# Patient Record
Sex: MALE | Race: BLACK OR AFRICAN AMERICAN | Employment: UNEMPLOYED | URBAN - METROPOLITAN AREA
[De-identification: names, ages, dates, MRNs, and addresses within clinical notes are randomized per-mention and may not be internally consistent; named-entity substitution may affect disease eponyms.]

---

## 2021-04-29 ENCOUNTER — HOSPITAL ENCOUNTER (OUTPATIENT)
Dept: PREADMISSION TESTING | Age: 56
Discharge: HOME | End: 2021-04-29
Payer: MEDICARE

## 2021-04-29 LAB — SARS-COV-2, COV2: NORMAL

## 2021-04-29 PROCEDURE — U0003 INFECTIOUS AGENT DETECTION BY NUCLEIC ACID (DNA OR RNA); SEVERE ACUTE RESPIRATORY SYNDROME CORONAVIRUS 2 (SARS-COV-2) (CORONAVIRUS DISEASE [COVID-19]), AMPLIFIED PROBE TECHNIQUE, MAKING USE OF HIGH THROUGHPUT TECHNOLOGIES AS DESCRIBED BY CMS-2020-01-R: HCPCS

## 2021-04-30 LAB — SARS-COV-2, COV2NT: NOT DETECTED

## 2021-05-03 ENCOUNTER — HOSPITAL ENCOUNTER (OUTPATIENT)
Age: 56
Setting detail: OUTPATIENT SURGERY
Discharge: HOME | End: 2021-05-03
Attending: INTERNAL MEDICINE | Admitting: INTERNAL MEDICINE
Payer: MEDICARE

## 2021-05-03 ENCOUNTER — ANESTHESIA EVENT (OUTPATIENT)
Dept: ENDOSCOPY | Age: 56
End: 2021-05-03
Payer: MEDICARE

## 2021-05-03 ENCOUNTER — APPOINTMENT (OUTPATIENT)
Dept: ENDOSCOPY | Age: 56
End: 2021-05-03
Attending: INTERNAL MEDICINE
Payer: MEDICARE

## 2021-05-03 ENCOUNTER — ANESTHESIA (OUTPATIENT)
Dept: ENDOSCOPY | Age: 56
End: 2021-05-03
Payer: MEDICARE

## 2021-05-03 VITALS
SYSTOLIC BLOOD PRESSURE: 141 MMHG | TEMPERATURE: 97.6 F | BODY MASS INDEX: 24.78 KG/M2 | OXYGEN SATURATION: 95 % | WEIGHT: 177 LBS | RESPIRATION RATE: 16 BRPM | HEART RATE: 79 BPM | HEIGHT: 71 IN | DIASTOLIC BLOOD PRESSURE: 87 MMHG

## 2021-05-03 PROCEDURE — 74011250636 HC RX REV CODE- 250/636: Performed by: NURSE ANESTHETIST, CERTIFIED REGISTERED

## 2021-05-03 PROCEDURE — 76040000019: Performed by: INTERNAL MEDICINE

## 2021-05-03 PROCEDURE — 88305 TISSUE EXAM BY PATHOLOGIST: CPT

## 2021-05-03 PROCEDURE — 76060000031 HC ANESTHESIA FIRST 0.5 HR: Performed by: INTERNAL MEDICINE

## 2021-05-03 PROCEDURE — 74011000250 HC RX REV CODE- 250: Performed by: NURSE ANESTHETIST, CERTIFIED REGISTERED

## 2021-05-03 PROCEDURE — 2709999900 HC NON-CHARGEABLE SUPPLY: Performed by: INTERNAL MEDICINE

## 2021-05-03 RX ORDER — ERGOCALCIFEROL 1.25 MG/1
50000 CAPSULE ORAL
COMMUNITY

## 2021-05-03 RX ORDER — LITHIUM CARBONATE 300 MG/1
300 CAPSULE ORAL
COMMUNITY

## 2021-05-03 RX ORDER — MIRTAZAPINE 15 MG/1
15 TABLET, FILM COATED ORAL
COMMUNITY

## 2021-05-03 RX ORDER — BUPROPION HYDROCHLORIDE 150 MG/1
150 TABLET, EXTENDED RELEASE ORAL DAILY
COMMUNITY

## 2021-05-03 RX ORDER — PROPOFOL 10 MG/ML
INJECTION, EMULSION INTRAVENOUS AS NEEDED
Status: DISCONTINUED | OUTPATIENT
Start: 2021-05-03 | End: 2021-05-03 | Stop reason: HOSPADM

## 2021-05-03 RX ORDER — TRAZODONE HYDROCHLORIDE 100 MG/1
100 TABLET ORAL
COMMUNITY

## 2021-05-03 RX ORDER — SODIUM CHLORIDE, SODIUM LACTATE, POTASSIUM CHLORIDE, CALCIUM CHLORIDE 600; 310; 30; 20 MG/100ML; MG/100ML; MG/100ML; MG/100ML
50 INJECTION, SOLUTION INTRAVENOUS CONTINUOUS
Status: DISCONTINUED | OUTPATIENT
Start: 2021-05-03 | End: 2021-05-03 | Stop reason: HOSPADM

## 2021-05-03 RX ORDER — SODIUM CHLORIDE, SODIUM LACTATE, POTASSIUM CHLORIDE, CALCIUM CHLORIDE 600; 310; 30; 20 MG/100ML; MG/100ML; MG/100ML; MG/100ML
INJECTION, SOLUTION INTRAVENOUS
Status: DISCONTINUED | OUTPATIENT
Start: 2021-05-03 | End: 2021-05-03 | Stop reason: HOSPADM

## 2021-05-03 RX ORDER — GLYCOPYRROLATE 0.2 MG/ML
INJECTION INTRAMUSCULAR; INTRAVENOUS AS NEEDED
Status: DISCONTINUED | OUTPATIENT
Start: 2021-05-03 | End: 2021-05-03 | Stop reason: HOSPADM

## 2021-05-03 RX ORDER — TAMSULOSIN HYDROCHLORIDE 0.4 MG/1
0.4 CAPSULE ORAL DAILY
COMMUNITY

## 2021-05-03 RX ORDER — METFORMIN HYDROCHLORIDE 500 MG/1
500 TABLET ORAL DAILY
COMMUNITY

## 2021-05-03 RX ORDER — OLANZAPINE 10 MG/1
10 TABLET ORAL
COMMUNITY

## 2021-05-03 RX ADMIN — PROPOFOL 50 MG: 10 INJECTION, EMULSION INTRAVENOUS at 11:49

## 2021-05-03 RX ADMIN — PROPOFOL 50 MG: 10 INJECTION, EMULSION INTRAVENOUS at 11:45

## 2021-05-03 RX ADMIN — GLYCOPYRROLATE 0.2 MG: 0.2 INJECTION, SOLUTION INTRAMUSCULAR; INTRAVENOUS at 11:38

## 2021-05-03 RX ADMIN — PROPOFOL 50 MG: 10 INJECTION, EMULSION INTRAVENOUS at 11:47

## 2021-05-03 RX ADMIN — SODIUM CHLORIDE, POTASSIUM CHLORIDE, SODIUM LACTATE AND CALCIUM CHLORIDE: 600; 310; 30; 20 INJECTION, SOLUTION INTRAVENOUS at 11:30

## 2021-05-03 RX ADMIN — PROPOFOL 50 MG: 10 INJECTION, EMULSION INTRAVENOUS at 11:43

## 2021-05-03 RX ADMIN — PROPOFOL 50 MG: 10 INJECTION, EMULSION INTRAVENOUS at 11:42

## 2021-05-03 NOTE — PROGRESS NOTES
Patient and mother given discharge education and gave back verbal understanding. Patient IV out and no signs of infection. Patient take to ride's car via wheelchair at the front of the hospital entrance.

## 2021-05-03 NOTE — ANESTHESIA PREPROCEDURE EVALUATION
Relevant Problems   No relevant active problems       Anesthetic History   No history of anesthetic complications            Review of Systems / Medical History  Patient summary reviewed, nursing notes reviewed and pertinent labs reviewed    Pulmonary  Within defined limits                 Neuro/Psych         Psychiatric history     Cardiovascular  Within defined limits                     GI/Hepatic/Renal  Within defined limits              Endo/Other  Within defined limits           Other Findings            Past Medical History:   Diagnosis Date    Anxiety     Depression     Ill-defined condition     Mentally slow according to mother       History reviewed. No pertinent surgical history.     Current Outpatient Medications   Medication Instructions    buPROPion SR (WELLBUTRIN SR) 150 mg, Oral, DAILY, In the morning     Cetirizine 10 mg, Oral, DAILY    ergocalciferol (ERGOCALCIFEROL) 50,000 Units, Oral, EVERY 7 DAYS    lithium carbonate 300 mg, Oral, EVERY BEDTIME, 2 pills     metFORMIN (GLUCOPHAGE) 500 mg, Oral, DAILY, In the morning     mirtazapine (REMERON) 15 mg, Oral, EVERY BEDTIME    OLANZapine (ZYPREXA) 10 mg, Oral, EVERY BEDTIME    tamsulosin (FLOMAX) 0.4 mg, Oral, DAILY    traZODone (DESYREL) 100 mg, Oral, EVERY BEDTIME       Current Facility-Administered Medications   Medication Dose Route Frequency    lactated Ringers infusion  50 mL/hr IntraVENous CONTINUOUS       Patient Vitals for the past 24 hrs:   Temp Pulse Resp BP SpO2   05/03/21 1048 37.1 °C (98.7 °F) (!) 56 18 (!) 147/74 97 %       No results found for: WBC, WBCLT, HGBPOC, HGB, HGBP, HCTPOC, HCT, PHCT, RBCH, PLT, MCV, HGBEXT, HCTEXT, PLTEXT  No results found for: NA, K, CL, CO2, AGAP, GLU, BUN, CREA, BUCR, GFRAA, GFRNA, CA, GFRAA  No results found for: APTT, PTP, INR, INREXT  No results found for: GLU, GLUCPOC  Physical Exam    Airway  Mallampati: I  TM Distance: 4 - 6 cm  Neck ROM: normal range of motion   Mouth opening: Normal Cardiovascular    Rhythm: regular  Rate: normal         Dental  No notable dental hx       Pulmonary  Breath sounds clear to auscultation               Abdominal  GI exam deferred       Other Findings            Anesthetic Plan    ASA: 2  Anesthesia type: total IV anesthesia and general          Induction: Intravenous  Anesthetic plan and risks discussed with: Patient and Family      General anesthesia was prescribed for this patient because by definition it is \"a drug-induced loss of consciousness during which patients are not arousable, even by painful stimulation. \" Sometimes, the ability to independently maintain ventilatory function is often impaired and patients often require assistance in maintaining a patent airway. Occasionally, positive pressure ventilation may be required because of depressed spontaneous ventilation or drug-induced depression of neuromuscular function. This depth of anesthesia is preferred for endoscopic/esophageal procedures to facilitate the procedure and for patient safety/quality of care.

## 2021-05-11 NOTE — ANESTHESIA POSTPROCEDURE EVALUATION
Procedure(s):  COLONOSCOPY. MAC    Anesthesia Post Evaluation      Multimodal analgesia: multimodal analgesia not used between 6 hours prior to anesthesia start to PACU discharge  Patient location during evaluation: bedside (Endoscopy suite)  Patient participation: complete - patient cannot participate  Level of consciousness: awake and alert  Pain score: 0  Pain management: adequate  Airway patency: patent  Anesthetic complications: no  Cardiovascular status: acceptable  Respiratory status: acceptable and nasal cannula  Hydration status: acceptable  Comments: This patient remained on the stretcher. The patient was handed off to the endoscopy nursing team.  All questions regarding pre-, intra-, and postoperative care were answered.   Post anesthesia nausea and vomiting:  none      INITIAL Post-op Vital signs:   Vitals Value Taken Time   /87 05/03/21 1229   Temp 36.4 °C (97.6 °F) 05/03/21 1210   Pulse 79 05/03/21 1229   Resp 16 05/03/21 1229   SpO2 95 % 05/03/21 1229

## 2024-02-28 ENCOUNTER — HOSPITAL ENCOUNTER (OUTPATIENT)
Facility: HOSPITAL | Age: 59
Discharge: HOME | End: 2024-03-02
Payer: MEDICARE

## 2024-02-28 ENCOUNTER — TRANSCRIBE ORDERS (OUTPATIENT)
Facility: HOSPITAL | Age: 59
End: 2024-02-28

## 2024-02-28 DIAGNOSIS — R05.9 COUGH, UNSPECIFIED TYPE: ICD-10-CM

## 2024-02-28 DIAGNOSIS — R05.9 COUGH, UNSPECIFIED TYPE: Primary | ICD-10-CM

## 2024-02-28 PROCEDURE — 71046 X-RAY EXAM CHEST 2 VIEWS: CPT

## 2024-06-25 ENCOUNTER — HOSPITAL ENCOUNTER (EMERGENCY)
Facility: HOSPITAL | Age: 59
Discharge: HOME | End: 2024-06-25

## 2024-06-25 VITALS
RESPIRATION RATE: 18 BRPM | TEMPERATURE: 98.2 F | HEART RATE: 58 BPM | SYSTOLIC BLOOD PRESSURE: 147 MMHG | OXYGEN SATURATION: 96 % | DIASTOLIC BLOOD PRESSURE: 82 MMHG

## 2024-06-26 NOTE — ED NOTES
Patient arrived to ED with family. Patient has no complaints at this time, Alert and oriented times four. Exhibits some flight of ideas but able to hold communication with nurse. Upon arrival patient remains calm, no signs of agitation expressed. Brought in by family from increased agitation and not following medication regimen. Patient refused to be seen in ED. Family did not tell patient he was coming into ED to be seen but wanted to get him help for his schizophrenia and to get him to take medication. Patient unable to get into psychiatric doctor until August due to scheduling issue.     Dr. Conti is patient Psychiatric doctor but told family to get TDO paper work at hospital. [facility] Explained to family the process of ECO for her to file so we can help him.  brought in to communicate the steps with family. Notified ED physician of situation, charge nurse notified. While figuring out solution to situation patient remained in ED triage for an hour with ED tech and remained calm the whole process. Patient and family walked out of ED by [place] police.

## 2024-08-02 ENCOUNTER — HOSPITAL ENCOUNTER (INPATIENT)
Facility: HOSPITAL | Age: 59
LOS: 9 days | Discharge: HOME | DRG: 885 | End: 2024-08-12
Attending: EMERGENCY MEDICINE | Admitting: PSYCHIATRY & NEUROLOGY
Payer: MEDICARE

## 2024-08-02 DIAGNOSIS — F20.1 DISORGANIZED SCHIZOPHRENIA (HCC): Primary | ICD-10-CM

## 2024-08-02 LAB
ALBUMIN SERPL-MCNC: 3.9 G/DL (ref 3.5–5)
ALBUMIN/GLOB SERPL: 1.1 (ref 1.1–2.2)
ALP SERPL-CCNC: 88 U/L (ref 45–117)
ALT SERPL-CCNC: 33 U/L (ref 12–78)
ANION GAP SERPL CALC-SCNC: 11 MMOL/L (ref 5–15)
AST SERPL W P-5'-P-CCNC: 15 U/L (ref 15–37)
BASOPHILS # BLD: 0 K/UL (ref 0–0.1)
BASOPHILS NFR BLD: 1 % (ref 0–1)
BILIRUB SERPL-MCNC: 0.9 MG/DL (ref 0.2–1)
BUN SERPL-MCNC: 14 MG/DL (ref 6–20)
BUN/CREAT SERPL: 15 (ref 12–20)
CA-I BLD-MCNC: 8.8 MG/DL (ref 8.5–10.1)
CHLORIDE SERPL-SCNC: 105 MMOL/L (ref 97–108)
CO2 SERPL-SCNC: 24 MMOL/L (ref 21–32)
CREAT SERPL-MCNC: 0.91 MG/DL (ref 0.7–1.3)
DIFFERENTIAL METHOD BLD: NORMAL
EOSINOPHIL # BLD: 0.2 K/UL (ref 0–0.4)
EOSINOPHIL NFR BLD: 4 % (ref 0–7)
ERYTHROCYTE [DISTWIDTH] IN BLOOD BY AUTOMATED COUNT: 11.9 % (ref 11.5–14.5)
FLUAV RNA SPEC QL NAA+PROBE: NOT DETECTED
FLUBV RNA SPEC QL NAA+PROBE: NOT DETECTED
GLOBULIN SER CALC-MCNC: 3.4 G/DL (ref 2–4)
GLUCOSE SERPL-MCNC: 101 MG/DL (ref 65–100)
HCT VFR BLD AUTO: 41.3 % (ref 36.6–50.3)
HGB BLD-MCNC: 13.6 G/DL (ref 12.1–17)
IMM GRANULOCYTES # BLD AUTO: 0 K/UL (ref 0–0.04)
IMM GRANULOCYTES NFR BLD AUTO: 0 % (ref 0–0.5)
LYMPHOCYTES # BLD: 1.8 K/UL (ref 0.8–3.5)
LYMPHOCYTES NFR BLD: 40 % (ref 12–49)
MCH RBC QN AUTO: 29.1 PG (ref 26–34)
MCHC RBC AUTO-ENTMCNC: 32.9 G/DL (ref 30–36.5)
MCV RBC AUTO: 88.2 FL (ref 80–99)
MONOCYTES # BLD: 0.4 K/UL (ref 0–1)
MONOCYTES NFR BLD: 8 % (ref 5–13)
NEUTS SEG # BLD: 2.2 K/UL (ref 1.8–8)
NEUTS SEG NFR BLD: 47 % (ref 32–75)
NRBC # BLD: 0 K/UL (ref 0–0.01)
NRBC BLD-RTO: 0 PER 100 WBC
PLATELET # BLD AUTO: 184 K/UL (ref 150–400)
PMV BLD AUTO: 10.3 FL (ref 8.9–12.9)
POTASSIUM SERPL-SCNC: 3.8 MMOL/L (ref 3.5–5.1)
PROT SERPL-MCNC: 7.3 G/DL (ref 6.4–8.2)
RBC # BLD AUTO: 4.68 M/UL (ref 4.1–5.7)
SARS-COV-2 RNA RESP QL NAA+PROBE: NOT DETECTED
SODIUM SERPL-SCNC: 140 MMOL/L (ref 136–145)
WBC # BLD AUTO: 4.6 K/UL (ref 4.1–11.1)

## 2024-08-02 PROCEDURE — 80178 ASSAY OF LITHIUM: CPT

## 2024-08-02 PROCEDURE — 80053 COMPREHEN METABOLIC PANEL: CPT

## 2024-08-02 PROCEDURE — 87636 SARSCOV2 & INF A&B AMP PRB: CPT

## 2024-08-02 PROCEDURE — 83036 HEMOGLOBIN GLYCOSYLATED A1C: CPT

## 2024-08-02 PROCEDURE — 82077 ASSAY SPEC XCP UR&BREATH IA: CPT

## 2024-08-02 PROCEDURE — 96372 THER/PROPH/DIAG INJ SC/IM: CPT

## 2024-08-02 PROCEDURE — 96374 THER/PROPH/DIAG INJ IV PUSH: CPT

## 2024-08-02 PROCEDURE — 6360000002 HC RX W HCPCS: Performed by: EMERGENCY MEDICINE

## 2024-08-02 PROCEDURE — 85025 COMPLETE CBC W/AUTO DIFF WBC: CPT

## 2024-08-02 PROCEDURE — 99285 EMERGENCY DEPT VISIT HI MDM: CPT

## 2024-08-02 RX ORDER — DIPHENHYDRAMINE HYDROCHLORIDE 50 MG/ML
25 INJECTION INTRAMUSCULAR; INTRAVENOUS
Status: COMPLETED | OUTPATIENT
Start: 2024-08-02 | End: 2024-08-02

## 2024-08-02 RX ORDER — HALOPERIDOL 5 MG/ML
5 INJECTION INTRAMUSCULAR EVERY 6 HOURS PRN
Status: DISCONTINUED | OUTPATIENT
Start: 2024-08-02 | End: 2024-08-03

## 2024-08-02 RX ADMIN — DIPHENHYDRAMINE HYDROCHLORIDE 25 MG: 50 INJECTION INTRAMUSCULAR; INTRAVENOUS at 21:13

## 2024-08-02 RX ADMIN — HALOPERIDOL LACTATE 5 MG: 5 INJECTION, SOLUTION INTRAMUSCULAR at 21:17

## 2024-08-02 ASSESSMENT — PAIN - FUNCTIONAL ASSESSMENT: PAIN_FUNCTIONAL_ASSESSMENT: 0-10

## 2024-08-02 ASSESSMENT — LIFESTYLE VARIABLES
HOW OFTEN DO YOU HAVE A DRINK CONTAINING ALCOHOL: NEVER
HOW MANY STANDARD DRINKS CONTAINING ALCOHOL DO YOU HAVE ON A TYPICAL DAY: PATIENT DOES NOT DRINK

## 2024-08-02 ASSESSMENT — PAIN SCALES - GENERAL: PAINLEVEL_OUTOF10: 0

## 2024-08-02 NOTE — ED TRIAGE NOTES
Patient presents voluntarily with sister/POA for complaint of mental health problem.  She states he was recently discharged from [facility].  Has since stopped taking PO meds other than vistaril which he calls \"cogentin.\"  She would like assistance in medication management and would like him to receive a Risperidone injection, unsure of dosing.  Last received 7/11/2024.  Denies SI/HI in triage.

## 2024-08-03 PROBLEM — F23 SCHIZOPHRENIA, ACUTE (HCC): Status: RESOLVED | Noted: 2024-08-03 | Resolved: 2024-08-03

## 2024-08-03 PROBLEM — F23 SCHIZOPHRENIA, ACUTE (HCC): Status: ACTIVE | Noted: 2024-08-03

## 2024-08-03 PROBLEM — F20.0 PARANOID SCHIZOPHRENIA (HCC): Status: ACTIVE | Noted: 2024-08-03

## 2024-08-03 LAB
AMPHET UR QL SCN: NEGATIVE
APPEARANCE UR: CLEAR
BACTERIA URNS QL MICRO: NEGATIVE /HPF
BARBITURATES UR QL SCN: NEGATIVE
BENZODIAZ UR QL: NEGATIVE
BILIRUB UR QL: NEGATIVE
CANNABINOIDS UR QL SCN: NEGATIVE
COCAINE UR QL SCN: NEGATIVE
COLOR UR: NORMAL
DATE LAST DOSE: NORMAL
DOSE AMOUNT: NORMAL UNITS
ETHANOL SERPL-MCNC: <10 MG/DL (ref 0–0.08)
GLUCOSE UR STRIP.AUTO-MCNC: NEGATIVE MG/DL
HGB UR QL STRIP: NEGATIVE
KETONES UR QL STRIP.AUTO: NEGATIVE MG/DL
LEUKOCYTE ESTERASE UR QL STRIP.AUTO: NEGATIVE
LITHIUM SERPL-SCNC: <0.2 MMOL/L
Lab: NORMAL
MDMA, URINE: NEGATIVE
METHADONE UR QL: NEGATIVE
NITRITE UR QL STRIP.AUTO: NEGATIVE
OPIATES UR QL: NEGATIVE
PCP UR QL: NEGATIVE
PH UR STRIP: 6 (ref 5–8)
PROT UR STRIP-MCNC: NEGATIVE MG/DL
RBC #/AREA URNS HPF: NORMAL /HPF (ref 0–3)
SP GR UR REFRACTOMETRY: 1.02 (ref 1–1.03)
URINE CULTURE IF INDICATED: NORMAL
UROBILINOGEN UR QL STRIP.AUTO: 0.2 EU/DL (ref 0.2–1)
WBC URNS QL MICRO: NORMAL /HPF (ref 0–5)

## 2024-08-03 PROCEDURE — 1240000000 HC EMOTIONAL WELLNESS R&B

## 2024-08-03 PROCEDURE — 80307 DRUG TEST PRSMV CHEM ANLYZR: CPT

## 2024-08-03 PROCEDURE — 81001 URINALYSIS AUTO W/SCOPE: CPT

## 2024-08-03 PROCEDURE — 6370000000 HC RX 637 (ALT 250 FOR IP): Performed by: PSYCHIATRY & NEUROLOGY

## 2024-08-03 PROCEDURE — 6370000000 HC RX 637 (ALT 250 FOR IP): Performed by: HOSPITALIST

## 2024-08-03 RX ORDER — TRAZODONE HYDROCHLORIDE 100 MG/1
100 TABLET ORAL NIGHTLY
Status: DISCONTINUED | OUTPATIENT
Start: 2024-08-03 | End: 2024-08-12 | Stop reason: HOSPADM

## 2024-08-03 RX ORDER — LITHIUM CARBONATE 300 MG/1
600 TABLET, FILM COATED, EXTENDED RELEASE ORAL
Status: ON HOLD | COMMUNITY
Start: 2024-07-13 | End: 2024-08-11

## 2024-08-03 RX ORDER — TRAZODONE HYDROCHLORIDE 100 MG/1
100 TABLET ORAL NIGHTLY
Status: ON HOLD | COMMUNITY
End: 2024-08-11

## 2024-08-03 RX ORDER — BUPROPION HYDROCHLORIDE 150 MG/1
150 TABLET, EXTENDED RELEASE ORAL DAILY
Status: DISCONTINUED | OUTPATIENT
Start: 2024-08-03 | End: 2024-08-12 | Stop reason: HOSPADM

## 2024-08-03 RX ORDER — ATORVASTATIN CALCIUM 10 MG/1
5 TABLET, FILM COATED ORAL NIGHTLY
Status: DISCONTINUED | OUTPATIENT
Start: 2024-08-04 | End: 2024-08-12 | Stop reason: HOSPADM

## 2024-08-03 RX ORDER — BUPROPION HYDROCHLORIDE 150 MG/1
150 TABLET, EXTENDED RELEASE ORAL DAILY
Status: ON HOLD | COMMUNITY
End: 2024-08-11

## 2024-08-03 RX ORDER — OLANZAPINE 10 MG/1
10 TABLET ORAL NIGHTLY
Status: ON HOLD | COMMUNITY
End: 2024-08-11

## 2024-08-03 RX ORDER — ATORVASTATIN CALCIUM 10 MG/1
5 TABLET, FILM COATED ORAL DAILY
Status: DISCONTINUED | OUTPATIENT
Start: 2024-08-03 | End: 2024-08-03

## 2024-08-03 RX ORDER — CHOLECALCIFEROL (VITAMIN D3) 50 MCG
TABLET ORAL
Status: ON HOLD | COMMUNITY
Start: 2024-05-31 | End: 2024-08-11 | Stop reason: HOSPADM

## 2024-08-03 RX ORDER — POLYETHYLENE GLYCOL 3350 17 G/17G
17 POWDER, FOR SOLUTION ORAL DAILY PRN
Status: DISCONTINUED | OUTPATIENT
Start: 2024-08-03 | End: 2024-08-12 | Stop reason: HOSPADM

## 2024-08-03 RX ORDER — OLANZAPINE 10 MG/1
10 TABLET, ORALLY DISINTEGRATING ORAL NIGHTLY
Status: DISCONTINUED | OUTPATIENT
Start: 2024-08-03 | End: 2024-08-12 | Stop reason: HOSPADM

## 2024-08-03 RX ORDER — FLUTICASONE PROPIONATE 50 MCG
SPRAY, SUSPENSION (ML) NASAL
COMMUNITY
Start: 2024-06-19

## 2024-08-03 RX ORDER — HYDROXYZINE PAMOATE 50 MG/1
50 CAPSULE ORAL 4 TIMES DAILY PRN
Status: ON HOLD | COMMUNITY
Start: 2024-07-13 | End: 2024-08-11

## 2024-08-03 RX ORDER — ACETAMINOPHEN 325 MG/1
650 TABLET ORAL EVERY 4 HOURS PRN
Status: DISCONTINUED | OUTPATIENT
Start: 2024-08-03 | End: 2024-08-12 | Stop reason: HOSPADM

## 2024-08-03 RX ORDER — CETIRIZINE HYDROCHLORIDE 10 MG/1
10 TABLET ORAL DAILY
Status: ON HOLD | COMMUNITY
Start: 2024-06-06 | End: 2024-08-11

## 2024-08-03 RX ORDER — TAMSULOSIN HYDROCHLORIDE 0.4 MG/1
0.4 CAPSULE ORAL DAILY
COMMUNITY

## 2024-08-03 RX ORDER — AMLODIPINE BESYLATE 10 MG/1
10 TABLET ORAL DAILY
Status: ON HOLD | COMMUNITY
Start: 2024-06-26 | End: 2024-08-11

## 2024-08-03 RX ORDER — HYDROXYZINE PAMOATE 25 MG/1
50 CAPSULE ORAL 4 TIMES DAILY PRN
Status: DISCONTINUED | OUTPATIENT
Start: 2024-08-03 | End: 2024-08-12 | Stop reason: HOSPADM

## 2024-08-03 RX ORDER — AMLODIPINE BESYLATE 10 MG/1
10 TABLET ORAL DAILY
Status: DISCONTINUED | OUTPATIENT
Start: 2024-08-03 | End: 2024-08-12 | Stop reason: HOSPADM

## 2024-08-03 RX ORDER — LITHIUM CARBONATE 300 MG/1
600 CAPSULE ORAL
Status: DISCONTINUED | OUTPATIENT
Start: 2024-08-03 | End: 2024-08-12 | Stop reason: HOSPADM

## 2024-08-03 RX ORDER — MONTELUKAST SODIUM 10 MG/1
10 TABLET ORAL NIGHTLY
COMMUNITY
Start: 2024-06-26

## 2024-08-03 RX ORDER — HYDROXYZINE 50 MG/1
50 TABLET, FILM COATED ORAL 3 TIMES DAILY PRN
Status: DISCONTINUED | OUTPATIENT
Start: 2024-08-03 | End: 2024-08-03

## 2024-08-03 RX ORDER — MAGNESIUM HYDROXIDE/ALUMINUM HYDROXICE/SIMETHICONE 120; 1200; 1200 MG/30ML; MG/30ML; MG/30ML
30 SUSPENSION ORAL EVERY 6 HOURS PRN
Status: DISCONTINUED | OUTPATIENT
Start: 2024-08-03 | End: 2024-08-12 | Stop reason: HOSPADM

## 2024-08-03 RX ORDER — CETIRIZINE HYDROCHLORIDE 5 MG/1
10 TABLET ORAL DAILY
Status: DISCONTINUED | OUTPATIENT
Start: 2024-08-03 | End: 2024-08-12 | Stop reason: HOSPADM

## 2024-08-03 RX ORDER — MONTELUKAST SODIUM 10 MG/1
10 TABLET ORAL NIGHTLY
Status: DISCONTINUED | OUTPATIENT
Start: 2024-08-03 | End: 2024-08-12 | Stop reason: HOSPADM

## 2024-08-03 RX ORDER — PRAVASTATIN SODIUM 20 MG
20 TABLET ORAL DAILY
Status: ON HOLD | COMMUNITY
Start: 2024-06-23 | End: 2024-08-11

## 2024-08-03 RX ORDER — HALOPERIDOL 5 MG/1
5 TABLET ORAL EVERY 4 HOURS PRN
Status: DISCONTINUED | OUTPATIENT
Start: 2024-08-03 | End: 2024-08-12 | Stop reason: HOSPADM

## 2024-08-03 RX ORDER — TRAZODONE HYDROCHLORIDE 50 MG/1
50 TABLET ORAL NIGHTLY PRN
Status: DISCONTINUED | OUTPATIENT
Start: 2024-08-03 | End: 2024-08-03

## 2024-08-03 RX ORDER — FLUTICASONE PROPIONATE 50 MCG
1 SPRAY, SUSPENSION (ML) NASAL DAILY
Status: DISCONTINUED | OUTPATIENT
Start: 2024-08-03 | End: 2024-08-12 | Stop reason: HOSPADM

## 2024-08-03 RX ORDER — TAMSULOSIN HYDROCHLORIDE 0.4 MG/1
0.4 CAPSULE ORAL DAILY
Status: DISCONTINUED | OUTPATIENT
Start: 2024-08-03 | End: 2024-08-12 | Stop reason: HOSPADM

## 2024-08-03 RX ORDER — BENZTROPINE MESYLATE 0.5 MG/1
0.5 TABLET ORAL 2 TIMES DAILY PRN
Status: DISCONTINUED | OUTPATIENT
Start: 2024-08-03 | End: 2024-08-12 | Stop reason: HOSPADM

## 2024-08-03 RX ORDER — NICOTINE 21 MG/24HR
1 PATCH, TRANSDERMAL 24 HOURS TRANSDERMAL DAILY
Status: DISCONTINUED | OUTPATIENT
Start: 2024-08-03 | End: 2024-08-12 | Stop reason: HOSPADM

## 2024-08-03 RX ORDER — DIPHENHYDRAMINE HYDROCHLORIDE 50 MG/ML
50 INJECTION INTRAMUSCULAR; INTRAVENOUS EVERY 4 HOURS PRN
Status: DISCONTINUED | OUTPATIENT
Start: 2024-08-03 | End: 2024-08-12 | Stop reason: HOSPADM

## 2024-08-03 RX ORDER — HALOPERIDOL 5 MG/ML
5 INJECTION INTRAMUSCULAR EVERY 4 HOURS PRN
Status: DISCONTINUED | OUTPATIENT
Start: 2024-08-03 | End: 2024-08-12 | Stop reason: HOSPADM

## 2024-08-03 RX ADMIN — TAMSULOSIN HYDROCHLORIDE 0.4 MG: 0.4 CAPSULE ORAL at 16:23

## 2024-08-03 RX ADMIN — AMLODIPINE BESYLATE 10 MG: 10 TABLET ORAL at 16:23

## 2024-08-03 RX ADMIN — TRAZODONE HYDROCHLORIDE 100 MG: 100 TABLET ORAL at 20:40

## 2024-08-03 RX ADMIN — CETIRIZINE HYDROCHLORIDE 10 MG: 5 TABLET ORAL at 16:23

## 2024-08-03 RX ADMIN — OLANZAPINE 10 MG: 10 TABLET, ORALLY DISINTEGRATING ORAL at 20:40

## 2024-08-03 RX ADMIN — LITHIUM CARBONATE 600 MG: 300 CAPSULE, GELATIN COATED ORAL at 20:40

## 2024-08-03 RX ADMIN — MONTELUKAST 10 MG: 10 TABLET, FILM COATED ORAL at 20:40

## 2024-08-03 RX ADMIN — BUPROPION HYDROCHLORIDE 150 MG: 150 TABLET, FILM COATED, EXTENDED RELEASE ORAL at 14:48

## 2024-08-03 ASSESSMENT — PAIN - FUNCTIONAL ASSESSMENT: PAIN_FUNCTIONAL_ASSESSMENT: NONE - DENIES PAIN

## 2024-08-03 ASSESSMENT — SLEEP AND FATIGUE QUESTIONNAIRES
AVERAGE NUMBER OF SLEEP HOURS: 8
SLEEP PATTERN: DIFFICULTY FALLING ASLEEP
DO YOU USE A SLEEP AID: YES
DO YOU HAVE DIFFICULTY SLEEPING: YES

## 2024-08-03 NOTE — ED NOTES
Pt met BSMART. Per BSMART pt will need ECO since he lacks capacity and is attempting to leave multiple times.

## 2024-08-03 NOTE — ED NOTES
Attempted to call report to [facility], Nurse unable to take report due to her currently giving report. Will call back in 15 minutes.

## 2024-08-03 NOTE — ED PROVIDER NOTES
Absolute 1.8 0.8 - 3.5 K/UL    Monocytes Absolute 0.4 0.0 - 1.0 K/UL    Eosinophils Absolute 0.2 0.0 - 0.4 K/UL    Basophils Absolute 0.0 0.0 - 0.1 K/UL    Immature Granulocytes Absolute 0.0 0.00 - 0.04 K/UL    Differential Type AUTOMATED     Comprehensive Metabolic Panel    Collection Time: 08/02/24 10:55 PM   Result Value Ref Range    Sodium 140 136 - 145 mmol/L    Potassium 3.8 3.5 - 5.1 mmol/L    Chloride 105 97 - 108 mmol/L    CO2 24 21 - 32 mmol/L    Anion Gap 11 5 - 15 mmol/L    Glucose 101 (H) 65 - 100 mg/dL    BUN 14 6 - 20 mg/dL    Creatinine 0.91 0.70 - 1.30 mg/dL    BUN/Creatinine Ratio 15 12 - 20      Est, Glom Filt Rate >90 >60 ml/min/1.73m2    Calcium 8.8 8.5 - 10.1 mg/dL    Total Bilirubin 0.9 0.2 - 1.0 mg/dL    AST 15 15 - 37 U/L    ALT 33 12 - 78 U/L    Alk Phosphatase 88 45 - 117 U/L    Total Protein 7.3 6.4 - 8.2 g/dL    Albumin 3.9 3.5 - 5.0 g/dL    Globulin 3.4 2.0 - 4.0 g/dL    Albumin/Globulin Ratio 1.1 1.1 - 2.2     Ethanol    Collection Time: 08/02/24 10:55 PM   Result Value Ref Range    Ethanol Lvl <10 <10 mg/dL   Lithium Level    Collection Time: 08/02/24 10:55 PM   Result Value Ref Range    Lithium <0.20 mmol/L    DOSE DATE No Special Requests      DOSE AMOUNT No Special Requests Units     ED COURSE and DIFFERENTIAL DIAGNOSIS/MDM   12:43 AM Differential and Considerations: Patient is a 58-year-old male presenting to the emergency department with his sister who is his power of  with noncompliance with his medication regimen and difficulty obtaining his Depo Risperdal.  We are unable to give here unless he is admitted to behavioral health.  We did ask [facility] to see him to help and they felt the patient should be placed under E CO/TDO.  Behavioral health and [place] [id number] no patient well on states this is typical for him to present.    We are unable to give the Depo risperidone but after speaking with the sister will give IM Haldol and Benadryl as needed Geodon    Will be placed

## 2024-08-03 NOTE — BSMART NOTE
Section IV - Mental Status Exam  The patient's appearance is unkept.  The patient's behavior is manic. The patient is only aware of  place and person.  The patient's speech is loud.  The patient's mood is expansive.  The range of affect shows no evidence of impairment.  The patient's thought content demonstrates grandiosity .  The thought process shows a flight of ideas and is tangential.  The patient's perception shows no evidence of impairment. The patient's memory is impaired.  The patient's appetite is decreased and shows signs of weight loss .  The patient's sleep has evidence of insomnia. The patient shows no insight.  The patient's judgement is psychologically impaired.      Section V - Substance Abuse  The patient is not using substances.      Section VI - Living Arrangements  The patient Single.  The patient lives with his sister. The patient has no children.  The patient does plan to return home upon discharge.  The patient does not have legal issues pending. The patient's source of income comes from social security.  Catholic and cultural practices have not been voiced at this time.    The patient's greatest support comes from his sister and this person will be involved with the treatment.    The patient has not been in an event described as horrible or outside the realm of ordinary life experience either currently or in the past.  The patient has not been a victim of sexual/physical abuse.    Section VII - Other Areas of Clinical Concern  The highest grade achieved is unknown with the overall quality of school experience being described as not assessed.  The patient is currently disabled and speaks English as a primary language.  The patient has no communication impairments affecting communication. The patient's preference for learning can be described as: can read and write adequately.  The patient's hearing is normal.  The patient's vision is normal.      ANG Alebrt, Supervisee in Social
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
## 2024-08-03 NOTE — H&P
08/03/2024 Negative  Negative   Final   • Blood, Urine 08/03/2024 Negative  Negative   Final   • Urobilinogen, Urine 08/03/2024 0.2  0.2 - 1.0 EU/dL Final   • Nitrite, Urine 08/03/2024 Negative  Negative   Final   • Leukocyte Esterase, Urine 08/03/2024 Negative  Negative   Final   • WBC, UA 08/03/2024 0-4  0 - 5 /hpf Final   • RBC, UA 08/03/2024 0-5  0 - 3 /hpf Final   • BACTERIA, URINE 08/03/2024 Negative  Negative /hpf Final   • Urine Culture if Indicated 08/03/2024 Culture not indicated by UA result  Culture not indicated by UA result   Final                   MEDICATIONS     CURRENT MEDICATIONS:   Current Facility-Administered Medications   Medication Dose Route Frequency Provider Last Rate Last Admin   • acetaminophen (TYLENOL) tablet 650 mg  650 mg Oral Q4H PRN Markos Cruz MD       • polyethylene glycol (GLYCOLAX) packet 17 g  17 g Oral Daily PRN Markos Cruz MD       • hydrOXYzine HCl (ATARAX) tablet 50 mg  50 mg Oral TID PRN Markos Cruz MD       • haloperidol (HALDOL) tablet 5 mg  5 mg Oral Q4H PRN Markos Cruz MD        Or   • haloperidol lactate (HALDOL) injection 5 mg  5 mg IntraMUSCular Q4H PRN Markos Cruz MD       • diphenhydrAMINE (BENADRYL) injection 50 mg  50 mg IntraMUSCular Q4H PRN Markos Cruz MD       • traZODone (DESYREL) tablet 50 mg  50 mg Oral Nightly PRN Markos Cruz MD       • aluminum & magnesium hydroxide-simethicone (MAALOX) 200-200-20 MG/5ML suspension 30 mL  30 mL Oral Q6H PRN Markos Cruz MD       • nicotine (NICODERM CQ) 14 MG/24HR 1 patch  1 patch TransDERmal Daily Markos Cruz MD   1 patch at 08/03/24 0857                 ASSESSMENT & PLAN        The patient, Fredy Puente, is a 58 y.o.  male who presents at this time for treatment of the following diagnoses:  Principal Problem:    Paranoid schizophrenia (HCC)    Patient admitted for worsening of mood and psychotic symptoms.  Based on risk

## 2024-08-03 NOTE — ED NOTES
Officers at bedside, Pt has been changed into paper scrubs and belongings has been placed in personal belonging bag at nurses station

## 2024-08-03 NOTE — GROUP NOTE
Group Therapy Note    Date: 8/3/2024    Group Start Time: 1000  Group End Time: 1045  Group Topic: Community Meeting    [facility]    Prema Gibson        Group Therapy Note    Attendees: 5       Patient's Goal:  To get focus.    Notes:      Status After Intervention:  Unchanged    Participation Level: Minimal    Participation Quality: Resistant      Speech:  normal      Thought Process/Content: Flight of ideas      Affective Functioning: Congruent      Mood: euthymic      Level of consciousness:  Alert      Response to Learning: Able to verbalize current knowledge/experience      Endings: None Reported    Modes of Intervention: Support      Discipline Responsible: Behavorial Health Tech      Signature:  Prema Gibson

## 2024-08-03 NOTE — ED NOTES
Pt attempted to leave and walked out side of the ER pt was redirectable and is now back in his room

## 2024-08-04 PROCEDURE — 1240000000 HC EMOTIONAL WELLNESS R&B

## 2024-08-04 PROCEDURE — 6370000000 HC RX 637 (ALT 250 FOR IP): Performed by: HOSPITALIST

## 2024-08-04 PROCEDURE — 6370000000 HC RX 637 (ALT 250 FOR IP): Performed by: PSYCHIATRY & NEUROLOGY

## 2024-08-04 RX ADMIN — ATORVASTATIN CALCIUM 5 MG: 10 TABLET, FILM COATED ORAL at 20:58

## 2024-08-04 RX ADMIN — METFORMIN HYDROCHLORIDE 500 MG: 500 TABLET ORAL at 08:25

## 2024-08-04 RX ADMIN — BUPROPION HYDROCHLORIDE 150 MG: 150 TABLET, FILM COATED, EXTENDED RELEASE ORAL at 08:25

## 2024-08-04 RX ADMIN — OLANZAPINE 10 MG: 10 TABLET, ORALLY DISINTEGRATING ORAL at 20:59

## 2024-08-04 RX ADMIN — MONTELUKAST 10 MG: 10 TABLET, FILM COATED ORAL at 20:59

## 2024-08-04 RX ADMIN — CETIRIZINE HYDROCHLORIDE 10 MG: 5 TABLET ORAL at 08:24

## 2024-08-04 RX ADMIN — TAMSULOSIN HYDROCHLORIDE 0.4 MG: 0.4 CAPSULE ORAL at 08:25

## 2024-08-04 RX ADMIN — AMLODIPINE BESYLATE 10 MG: 10 TABLET ORAL at 08:24

## 2024-08-04 RX ADMIN — FLUTICASONE PROPIONATE 1 SPRAY: 50 SPRAY, METERED NASAL at 11:51

## 2024-08-04 RX ADMIN — TRAZODONE HYDROCHLORIDE 100 MG: 100 TABLET ORAL at 20:59

## 2024-08-04 RX ADMIN — LITHIUM CARBONATE 600 MG: 300 CAPSULE, GELATIN COATED ORAL at 20:58

## 2024-08-04 ASSESSMENT — PAIN SCALES - GENERAL: PAINLEVEL_OUTOF10: 0

## 2024-08-04 NOTE — GROUP NOTE
Group Therapy Note    Date: 8/3/2024    Group Start Time: 2000  Group End Time: 2045  Group Topic: Wrap-Up    [facility]    Cindi Rashid CNA        Group Therapy Note    Attendees: 4       Patient's Goal:  none    Notes:  patient came to group ate snack and left out no participation    Status After Intervention:  Unchanged    Participation Level: None    Participation Quality: Appropriate      Speech:  normal      Thought Process/Content: Perseverating      Affective Functioning: Flat      Mood: anxious and depressed      Level of consciousness:  Alert      Response to Learning: Able to verbalize current knowledge/experience and Resistant      Endings: None Reported    Modes of Intervention: Activity      Discipline Responsible: Behavorial Health Tech      Signature:  Cindi Rashid CNA

## 2024-08-04 NOTE — GROUP NOTE
Group Therapy Note    Date: 8/4/2024    Group Start Time: 1330  Group End Time: 1415  Group Topic: Community Meeting    [facility]    Melissa Zepeda        Group Therapy Note    Attendees: 1       Patient's Goal:  None    Notes: Slept a lot   ate 1/2 of meals   Did not answer questions reference Depression anxiety   Pain but feels he is  making 10 on progress    No thoughts of self harm         Status After Intervention:  Unchanged    Participation Level: None    Participation Quality: Resistant      Speech:  hesitant      Thought Process/Content: Delusional  Flight of ideas      Affective Functioning: Congruent      Mood: depressed      Level of consciousness:  Inattentive      Response to Learning: Progressing to goal      Endings: None Reported    Modes of Intervention: Support      Discipline Responsible: Behavorial Health Tech      Signature:  Melissa Ibrahim

## 2024-08-04 NOTE — GROUP NOTE
Group Therapy Note    Date: 8/4/2024    Group Start Time: 0830  Group End Time: 0915  Group Topic: Nursing    [facility]    Vanessa Sy LPN        Group Therapy Note    Attendees: 3       Patient's Goal:  TO GO HOME.    Notes:  INDIVIDUAL-MEDICATION COMPLIANCE. PT. IS MEDICATION COMPLIANT.    Status After Intervention:  Improved    Participation Level: Active Listener    Participation Quality: Appropriate and Attentive      Speech:  normal      Thought Process/Content: Logical      Affective Functioning: Congruent      Mood:  CALM      Level of consciousness:  Alert      Response to Learning: Able to verbalize current knowledge/experience, Able to retain information, Capable of insight, and Progressing to goal      Endings: None Reported    Modes of Intervention: Education and Support      Discipline Responsible: Licensed Practical Nurse      Signature:  Vanessa Sy LPN

## 2024-08-05 LAB
EST. AVERAGE GLUCOSE BLD GHB EST-MCNC: 108 MG/DL
HBA1C MFR BLD: 5.4 % (ref 4–5.6)

## 2024-08-05 PROCEDURE — 6370000000 HC RX 637 (ALT 250 FOR IP): Performed by: HOSPITALIST

## 2024-08-05 PROCEDURE — 1240000000 HC EMOTIONAL WELLNESS R&B

## 2024-08-05 PROCEDURE — 6370000000 HC RX 637 (ALT 250 FOR IP): Performed by: PSYCHIATRY & NEUROLOGY

## 2024-08-05 RX ORDER — BENZTROPINE MESYLATE 0.5 MG/1
0.5 TABLET ORAL 2 TIMES DAILY
Status: DISCONTINUED | OUTPATIENT
Start: 2024-08-05 | End: 2024-08-12 | Stop reason: HOSPADM

## 2024-08-05 RX ORDER — BENZTROPINE MESYLATE 1 MG/1
1 TABLET ORAL 2 TIMES DAILY
Status: DISCONTINUED | OUTPATIENT
Start: 2024-08-05 | End: 2024-08-05

## 2024-08-05 RX ADMIN — OLANZAPINE 10 MG: 10 TABLET, ORALLY DISINTEGRATING ORAL at 20:16

## 2024-08-05 RX ADMIN — METFORMIN HYDROCHLORIDE 500 MG: 500 TABLET ORAL at 08:45

## 2024-08-05 RX ADMIN — ATORVASTATIN CALCIUM 5 MG: 10 TABLET, FILM COATED ORAL at 20:16

## 2024-08-05 RX ADMIN — TRAZODONE HYDROCHLORIDE 100 MG: 100 TABLET ORAL at 20:16

## 2024-08-05 RX ADMIN — LITHIUM CARBONATE 600 MG: 300 CAPSULE, GELATIN COATED ORAL at 20:16

## 2024-08-05 RX ADMIN — TAMSULOSIN HYDROCHLORIDE 0.4 MG: 0.4 CAPSULE ORAL at 08:45

## 2024-08-05 RX ADMIN — BENZTROPINE MESYLATE 0.5 MG: 0.5 TABLET ORAL at 20:15

## 2024-08-05 RX ADMIN — AMLODIPINE BESYLATE 10 MG: 10 TABLET ORAL at 08:45

## 2024-08-05 RX ADMIN — CETIRIZINE HYDROCHLORIDE 10 MG: 5 TABLET ORAL at 08:45

## 2024-08-05 RX ADMIN — MONTELUKAST 10 MG: 10 TABLET, FILM COATED ORAL at 20:15

## 2024-08-05 RX ADMIN — BUPROPION HYDROCHLORIDE 150 MG: 150 TABLET, FILM COATED, EXTENDED RELEASE ORAL at 08:45

## 2024-08-05 NOTE — GROUP NOTE
Group Therapy Note    Date: 8/4/2024    Group Start Time: 2000  Group End Time: 2045  Group Topic: Wrap-Up    [facility]    Cindi Rashid CNA        Group Therapy Note    Attendees: 4       Patient's Goal:  Pt. Say he's trying to win    Notes:  participated in group    Status After Intervention:  Improved    Participation Level: Active Listener    Participation Quality: Appropriate      Speech:  normal      Thought Process/Content: Perseverating      Affective Functioning: Flat      Mood: depressed      Level of consciousness:  Alert      Response to Learning: Able to verbalize current knowledge/experience and Resistant      Endings: None Reported    Modes of Intervention: Activity      Discipline Responsible: Behavorial Health Tech      Signature:  Cindi Rashid CNA

## 2024-08-05 NOTE — CARE COORDINATION
08/05/24 1326   ITP   Date of Plan 08/05/24   Date of Next Review 08/12/24   Primary Diagnosis Code Paranoid schizophrenia   Barriers to Treatment Client resistance;Need for psychoeducation;Psychiatric symptom (comment)   Strengths Incorporated in Plan Acknowledging need for assistance;Community supports;Family supports;Natural supports;Postive outlook   Plan of Care   Long Term Goal (LTG) Stated in patient/guardian terms On PSA   Short Term Goal 1   Short Term Goal 1 Client will learn and demonstrate improved self management skills   Baseline Functioning Per report of pt's sister, pt can present as disorganized but not aggressive   Target TBD   Objectives Client will participate in individual therapy;Client will participate in group therapy   Intervention 1 Acknowledge client strengths   Frequency Daily   Measured by Behavioral data;Self report;Staff observation   Staff Responsible [facility] staff;Clinical staff   Intervention 2 Referral to community services   Frequency Weekly   Measured by Behavioral data;Self report;Staff observation   Staff Responsible [facility] staff;Clinical staff   Intervention 3 Group therapy   Frequency Daily   Measured by Behavioral data;Self report;Staff observation   Staff Responsible [facility] staff;Clinical staff   STG Goal 1 Status: Patient Appears to be  Partially meeting treatment plan goal   Short Term Goal 2   Short Term Goal 2 Client will maintain compliance with medication regime   Baseline Functioning Unknown   Target TBD   Objectives Client will participate in individual therapy;Client will participate in group therapy   Intervention 1 Referral to community services   Frequency Weekly   Measured by Behavioral data;Self report;Staff observation   Staff Responsible [facility] staff;Clinical staff   Intervention 2 Monitor medications   Frequency Daily   Measured by Self report;Behavioral data;Staff observation   Staff Responsible [facility] staff   STG Goal 2 Status: Patient Appears to be  Partially meeting

## 2024-08-05 NOTE — PROGRESS NOTES
Psychiatric Progress Note      Patient: Fredy Puente MRN: [id number]  SSN: [id number]    YOB: 1965  Age: 58 y.o.  Sex: male      Admit Date: 8/2/2024       Subjective:     Fredy Puente stated he is feeling better with his mood.  Continues to limited insight about his hospitalization.  Continues to be preoccupied about medications.  Feeling less paranoid.  Denied any auditory hallucinations.  Denied any suicidal or homicidal ideations.  Reported good sleep last night.  Denied side effects of medications.    Objective:     Vitals:    08/03/24 1501 08/04/24 0646 08/04/24 0824 08/04/24 1456   BP: 134/82 113/70 113/70 116/67   Pulse: 50 72  59   Resp: 16 17     Temp: 97.8 °F (36.6 °C) 97.7 °F (36.5 °C)  97.7 °F (36.5 °C)   TempSrc: Temporal Temporal     SpO2: 96% 96%  99%   Weight:       Height:            Mental Status Exam:     Appearance- poorly groomed  Alert, oriented  to self  Speech -normal rate, volume and rhythm  Mood-anxious  Affect-constricted  Thought process-concrete  Thought content- delusions   Thought perception-auditory hallucinations   No suicidal or homicidal ideations   Insight limited  Judgement limited    MEDICATIONS:  Current Facility-Administered Medications   Medication Dose Route Frequency    acetaminophen (TYLENOL) tablet 650 mg  650 mg Oral Q4H PRN    polyethylene glycol (GLYCOLAX) packet 17 g  17 g Oral Daily PRN    haloperidol (HALDOL) tablet 5 mg  5 mg Oral Q4H PRN    Or    haloperidol lactate (HALDOL) injection 5 mg  5 mg IntraMUSCular Q4H PRN    diphenhydrAMINE (BENADRYL) injection 50 mg  50 mg IntraMUSCular Q4H PRN    aluminum & magnesium hydroxide-simethicone (MAALOX) 200-200-20 MG/5ML suspension 30 mL  30 mL Oral Q6H PRN    nicotine (NICODERM CQ) 14 MG/24HR 1 patch  1 patch TransDERmal Daily    benztropine (COGENTIN) tablet 0.5 mg  0.5 mg Oral BID PRN    buPROPion (WELLBUTRIN SR) extended release tablet 150 mg  150 mg Oral Daily    hydrOXYzine pamoate

## 2024-08-05 NOTE — GROUP NOTE
Group Therapy Note    Date: 8/5/2024    Group Start Time: 1345  Group End Time: 1500  Group Topic:  Group    [facility]    Cinthia Cueva        Group Therapy Note    Attendees: 2/3    Writer facilitated an inpatient psych-ed/process group combo. Writer encouraged patients to process any feelings they may be having, discuss discharge plans, etc. Writer held the space as patients processed. Writer facilitated a psych-ed discussion regarding addictions and risk factors. Writer encouraged patients to process. Writer concluded session with a grounding exercise and encouraging patients to provide input and feedback regarding the group.     Note: Pt symptoms acute for group. Pt's walking in and out of group. Writer facilitated parts of the group as individual sessions.        Patient's Goal:  To attend and participate in groups and activities daily    Notes:  Pt actively participated. Pt was able to discuss discharge plans. Pt slightly disorganized.    Status After Intervention:  Improved    Participation Level: Active Listener and Interactive    Participation Quality: Appropriate, Attentive, and Sharing      Speech:  normal      Thought Process/Content: Delusional      Affective Functioning: Congruent      Mood: euthymic      Level of consciousness:  Alert, Attentive, and Preoccupied      Response to Learning: Able to verbalize/acknowledge new learning, Able to retain information, and Progressing to goal      Endings: None Reported    Modes of Intervention: Education, Exploration, and Activity      Discipline Responsible: /Counselor      Signature:  Cinthia Cueva LPC

## 2024-08-05 NOTE — PROGRESS NOTES
Psychiatric Progress Note      Patient: Fredy Puente MRN: [id number]  SSN: [id number]    YOB: 1965  Age: 58 y.o.  Sex: male      Admit Date: 8/2/2024       Subjective:     Fredy Puente patient continues to have disorganized thoughts and delusions.  Limited insight about his hospitalization.  Denied any suicidal or homicidal ideations.  He reported some stress related to finances.  Reported good sleep last night.  Denied any side effects of medications.    Objective:     Vitals:    08/04/24 0824 08/04/24 1456 08/05/24 0621 08/05/24 0845   BP: 113/70 116/67 106/68 106/68   Pulse:  59 61    Resp:   16    Temp:  97.7 °F (36.5 °C) 97.8 °F (36.6 °C)    TempSrc:   Temporal    SpO2:  99% 94%    Weight:       Height:            Mental Status Exam:     Appearance- poorly groomed  Alert, oriented  to self  Speech -normal rate, volume and rhythm  Mood-anxious  Affect-constricted  Thought process-concrete  Thought content- delusions   Thought perception-auditory hallucinations   No suicidal or homicidal ideations   Insight limited  Judgement limited    MEDICATIONS:  Current Facility-Administered Medications   Medication Dose Route Frequency    acetaminophen (TYLENOL) tablet 650 mg  650 mg Oral Q4H PRN    polyethylene glycol (GLYCOLAX) packet 17 g  17 g Oral Daily PRN    haloperidol (HALDOL) tablet 5 mg  5 mg Oral Q4H PRN    Or    haloperidol lactate (HALDOL) injection 5 mg  5 mg IntraMUSCular Q4H PRN    diphenhydrAMINE (BENADRYL) injection 50 mg  50 mg IntraMUSCular Q4H PRN    aluminum & magnesium hydroxide-simethicone (MAALOX) 200-200-20 MG/5ML suspension 30 mL  30 mL Oral Q6H PRN    nicotine (NICODERM CQ) 14 MG/24HR 1 patch  1 patch TransDERmal Daily    benztropine (COGENTIN) tablet 0.5 mg  0.5 mg Oral BID PRN    buPROPion (WELLBUTRIN SR) extended release tablet 150 mg  150 mg Oral Daily    hydrOXYzine pamoate (VISTARIL) capsule 50 mg  50 mg Oral 4x Daily PRN    lithium capsule 600 mg  600 mg

## 2024-08-05 NOTE — GROUP NOTE
Group Therapy Note    Date: 8/5/2024    Group Start Time: 0830  Group End Time: 0915  Group Topic: Nursing    [facility]    Shelton Cortes RN    Medication Compliance    Group Therapy Note    Attendees: 3       Patient's Goal:  To take medications consistently    Notes:  Pt will set alarms, organize a bill box, and look into mental health skill building to help him take his medications.     Status After Intervention:  Improved    Participation Level: Active Listener and Interactive    Participation Quality: Appropriate, Attentive, Sharing, and Supportive      Speech:  normal      Thought Process/Content: Logical      Affective Functioning: Congruent      Mood:  calm      Level of consciousness:  Alert, Oriented x4, and Attentive      Response to Learning: Able to verbalize current knowledge/experience, Able to verbalize/acknowledge new learning, Able to retain information, and Progressing to goal      Endings: None Reported    Modes of Intervention: Education, Support, Socialization, and Problem-solving      Discipline Responsible: Registered Nurse      Signature:  SHELTON CORTES RN

## 2024-08-06 PROCEDURE — 6370000000 HC RX 637 (ALT 250 FOR IP): Performed by: HOSPITALIST

## 2024-08-06 PROCEDURE — 6370000000 HC RX 637 (ALT 250 FOR IP): Performed by: PSYCHIATRY & NEUROLOGY

## 2024-08-06 PROCEDURE — 1240000000 HC EMOTIONAL WELLNESS R&B

## 2024-08-06 RX ADMIN — BENZTROPINE MESYLATE 0.5 MG: 0.5 TABLET ORAL at 08:50

## 2024-08-06 RX ADMIN — BENZTROPINE MESYLATE 0.5 MG: 0.5 TABLET ORAL at 20:34

## 2024-08-06 RX ADMIN — FLUTICASONE PROPIONATE 1 SPRAY: 50 SPRAY, METERED NASAL at 08:57

## 2024-08-06 RX ADMIN — METFORMIN HYDROCHLORIDE 500 MG: 500 TABLET ORAL at 08:50

## 2024-08-06 RX ADMIN — AMLODIPINE BESYLATE 10 MG: 10 TABLET ORAL at 08:50

## 2024-08-06 RX ADMIN — BUPROPION HYDROCHLORIDE 150 MG: 150 TABLET, FILM COATED, EXTENDED RELEASE ORAL at 08:50

## 2024-08-06 RX ADMIN — OLANZAPINE 10 MG: 10 TABLET, ORALLY DISINTEGRATING ORAL at 20:34

## 2024-08-06 RX ADMIN — CETIRIZINE HYDROCHLORIDE 10 MG: 5 TABLET ORAL at 08:50

## 2024-08-06 RX ADMIN — TAMSULOSIN HYDROCHLORIDE 0.4 MG: 0.4 CAPSULE ORAL at 08:50

## 2024-08-06 RX ADMIN — ACETAMINOPHEN 650 MG: 325 TABLET ORAL at 13:56

## 2024-08-06 RX ADMIN — MONTELUKAST 10 MG: 10 TABLET, FILM COATED ORAL at 20:34

## 2024-08-06 RX ADMIN — ATORVASTATIN CALCIUM 5 MG: 10 TABLET, FILM COATED ORAL at 20:34

## 2024-08-06 RX ADMIN — LITHIUM CARBONATE 600 MG: 300 CAPSULE, GELATIN COATED ORAL at 20:34

## 2024-08-06 RX ADMIN — ACETAMINOPHEN 650 MG: 325 TABLET ORAL at 18:23

## 2024-08-06 RX ADMIN — TRAZODONE HYDROCHLORIDE 100 MG: 100 TABLET ORAL at 20:35

## 2024-08-06 ASSESSMENT — PAIN - FUNCTIONAL ASSESSMENT: PAIN_FUNCTIONAL_ASSESSMENT: ACTIVITIES ARE NOT PREVENTED

## 2024-08-06 ASSESSMENT — PAIN DESCRIPTION - ORIENTATION: ORIENTATION: LEFT;RIGHT

## 2024-08-06 ASSESSMENT — PAIN DESCRIPTION - DESCRIPTORS: DESCRIPTORS: ACHING

## 2024-08-06 ASSESSMENT — PAIN SCALES - GENERAL: PAINLEVEL_OUTOF10: 7

## 2024-08-06 ASSESSMENT — PAIN DESCRIPTION - LOCATION: LOCATION: BACK;TEETH

## 2024-08-06 NOTE — GROUP NOTE
Group Therapy Note    Date: 8/6/2024    Group Start Time: 1100  Group End Time: 1145  Group Topic: Nursing    [facility]    Shelton Cortes, ERICA    Writer facilitated a group with patients on short term vs long term goals.    Patients were to write at least one short term goal and one long term goal    Group Therapy Note    Attendees: 3       Patient's Goal:  To focus on himself and be goal oriented.     Notes:  Short term goal: 1) Positive self talk       Pt reports he will say positive affirmations to himself daily.     Long term goal: 1) Manage money better      Pt reports he gambles a lot and sometimes loses a lot of his money.    Status After Intervention:  Improved    Participation Level: Active Listener and Interactive    Participation Quality: Appropriate, Attentive, Sharing, and Supportive      Speech: Disorganized      Thought Process/Content: Logical      Affective Functioning: Congruent      Mood:  calm      Level of consciousness:  Alert, Oriented x4, and Attentive      Response to Learning: Able to verbalize current knowledge/experience, Able to verbalize/acknowledge new learning, Able to retain information, Capable of insight, and Progressing to goal      Endings: None Reported    Modes of Intervention: Education, Support, and Socialization      Discipline Responsible: Registered Nurse      Signature:  SHELTON CORTES RN

## 2024-08-06 NOTE — PROGRESS NOTES
Psychiatric Progress Note      Patient: Fredy Puente MRN: [id number]  SSN: [id number]    YOB: 1965  Age: 58 y.o.  Sex: male      Admit Date: 8/2/2024       Subjective:     Fredy Puente patient continues to have disorganized thoughts and delusions.  No aggressive behaviors.  I [place] some groups.  Continues to have limited insight about his hospitalization.  He was committed during the court hearing.  Denied any suicidal or homicidal ideations but reported good sleep and appetite.  Preoccupied with getting discharged.    Objective:     Vitals:    08/05/24 0845 08/05/24 1539 08/06/24 0605 08/06/24 0850   BP: 106/68 116/63 123/71 123/71   Pulse:  64 64    Resp:  17 16    Temp:  98 °F (36.7 °C) 97.3 °F (36.3 °C)    TempSrc:  Temporal Temporal    SpO2:  96% 97%    Weight:       Height:            Mental Status Exam:     Appearance- poorly groomed  Alert, oriented  to self  Speech -normal rate, volume and rhythm  Mood-anxious  Affect-constricted  Thought process-concrete  Thought content- delusions   Thought perception-auditory hallucinations   No suicidal or homicidal ideations   Insight limited  Judgement limited    MEDICATIONS:  Current Facility-Administered Medications   Medication Dose Route Frequency    benztropine (COGENTIN) tablet 0.5 mg  0.5 mg Oral BID    acetaminophen (TYLENOL) tablet 650 mg  650 mg Oral Q4H PRN    polyethylene glycol (GLYCOLAX) packet 17 g  17 g Oral Daily PRN    haloperidol (HALDOL) tablet 5 mg  5 mg Oral Q4H PRN    Or    haloperidol lactate (HALDOL) injection 5 mg  5 mg IntraMUSCular Q4H PRN    diphenhydrAMINE (BENADRYL) injection 50 mg  50 mg IntraMUSCular Q4H PRN    aluminum & magnesium hydroxide-simethicone (MAALOX) 200-200-20 MG/5ML suspension 30 mL  30 mL Oral Q6H PRN    nicotine (NICODERM CQ) 14 MG/24HR 1 patch  1 patch TransDERmal Daily    benztropine (COGENTIN) tablet 0.5 mg  0.5 mg Oral BID PRN    buPROPion (WELLBUTRIN SR) extended release tablet

## 2024-08-06 NOTE — GROUP NOTE
Group Therapy Note    Date: 8/6/2024    Group Start Time: 1300  Group End Time: 1345  Group Topic: Process Group - Inpatient    [facility]    Cinthia Cueva        Group Therapy Note    Attendees: 2/3    Writer facilitated an inpatient processing group. Writer had patients engage in a reflective activity in which they were asked to discuss where they need to put their energy versus areas where they need to shift their focus less. Writer encouraged pts discuss discharge plans, express feelings, etc. Writer held the space as patient processed. Writer concluded session with a grounding exercise and encouraging patients to provide input and feedback regarding the group.        Patient's Goal:  To attend and participate in groups and activities daily    Notes:  Pt actively participated. Pt was able to discuss discharge plans.     Status After Intervention:  Improved    Participation Level: Active Listener and Interactive    Participation Quality: Appropriate, Attentive, and Sharing      Speech:  normal      Thought Process/Content: Delusional      Affective Functioning: Congruent      Mood: euthymic      Level of consciousness:  Alert, Oriented x4, and Attentive      Response to Learning: Able to retain information, Capable of insight, and Progressing to goal      Endings: None Reported    Modes of Intervention: Exploration and Activity      Discipline Responsible: /Counselor      Signature:  Cinthia Cueva LPC

## 2024-08-06 NOTE — GROUP NOTE
Group Therapy Note    Date: 8/6/2024    Group Start Time: 1600  Group End Time: 1645  Group Topic: Focus Group    [facility]    Viola Mustafa LPN        Group Therapy Note    Attendees: 3/3       Patient's Goal:  to stay happy    Notes:  self focus group:   Pt. States it it hard for him to focus on himself sometimes depending on the situation he is in, but he tries and wants help.    Status After Intervention:  Unchanged    Participation Level: Active Listener    Participation Quality: Appropriate      Speech:  normal      Thought Process/Content: Logical      Affective Functioning: Congruent      Mood: euthymic      Level of consciousness:  Alert, Oriented x4, and Attentive      Response to Learning: Capable of insight, Able to change behavior, and Progressing to goal      Endings: None Reported    Modes of Intervention: Education      Discipline Responsible: Licensed Practical Nurse      Signature:  Viola Mustafa LPN

## 2024-08-06 NOTE — GROUP NOTE
Group Therapy Note    Date: 8/6/2024    Group Start Time: 1000  Group End Time: 1045  Group Topic: Activity    [facility]    Viola Mustafa LPN        Group Therapy Note    Attendees: 3/3       Patient's Goal:  to be happy    Notes:  boundries group:  Pt. States he understand what boundries mean.    Status After Intervention:  Unchanged    Participation Level: Active Listener    Participation Quality: Appropriate      Speech:  normal      Thought Process/Content: Logical      Affective Functioning: Congruent      Mood: euthymic      Level of consciousness:  Alert, Oriented x4, and Attentive      Response to Learning: Able to retain information, Capable of insight, and Progressing to goal      Endings: None Reported    Modes of Intervention: Education      Discipline Responsible: Licensed Practical Nurse      Signature:  Viola Mustafa LPN

## 2024-08-06 NOTE — GROUP NOTE
Group Therapy Note    Date: 8/6/2024    Group Start Time: 0900  Group End Time: 0945  Group Topic: Community Meeting    [facility]    Viola Mustafa LPN        Group Therapy Note    Attendees: 3/3       Patient's Goal:  to feel good    Notes:  community group:  filled out am paper, spoke about his goal for today, to participate more and interact with peers.    Status After Intervention:  Unchanged    Participation Level: Active Listener and Interactive    Participation Quality: Appropriate, Attentive, and Sharing      Speech:  normal      Thought Process/Content: Logical      Affective Functioning: Congruent      Mood: euthymic      Level of consciousness:  Alert, Oriented x4, and Attentive      Response to Learning: Able to retain information, Capable of insight, and Progressing to goal      Endings: None Reported    Modes of Intervention: Education      Discipline Responsible: Licensed Practical Nurse      Signature:  Viola Mustafa LPN

## 2024-08-06 NOTE — GROUP NOTE
Group Therapy Note    Date: 8/6/2024    Group Start Time: 1345  Group End Time: 1430  Group Topic: Psychoeducation    [facility]    Cinthia Cueva        Group Therapy Note    Attendees: 2/3    Writer facilitated an inpatient psych-ed group. Writer provided a handout regarding Anger and the Iceberg. Writer processed with pts the more complex emotions associated with anger. Writer encouraged patients to share and reflect. Writer concluded session with a grounding exercise and encouraging patients to provide input and feedback regarding the group.        Patient's Goal:  To attend and participate in groups and activities daily.    Notes:  Pt actively participated. Pt was able to discuss anger and various examples.    Status After Intervention:  Improved    Participation Level: Active Listener and Interactive    Participation Quality: Appropriate and Attentive      Speech:  normal      Thought Process/Content: Delusional      Affective Functioning: Congruent      Mood: euthymic      Level of consciousness:  Alert, Oriented x4, and Attentive      Response to Learning: Able to retain information, Capable of insight, and Progressing to goal      Endings: None Reported    Modes of Intervention: Education      Discipline Responsible: /Counselor      Signature:  Cinthia Cueva LPC

## 2024-08-07 PROCEDURE — 6370000000 HC RX 637 (ALT 250 FOR IP): Performed by: PSYCHIATRY & NEUROLOGY

## 2024-08-07 PROCEDURE — 1240000000 HC EMOTIONAL WELLNESS R&B

## 2024-08-07 PROCEDURE — 6370000000 HC RX 637 (ALT 250 FOR IP): Performed by: HOSPITALIST

## 2024-08-07 RX ORDER — CARBOXYMETHYLCELLULOSE SODIUM 5 MG/ML
1 SOLUTION/ DROPS OPHTHALMIC EVERY 6 HOURS PRN
Status: DISCONTINUED | OUTPATIENT
Start: 2024-08-07 | End: 2024-08-12 | Stop reason: HOSPADM

## 2024-08-07 RX ORDER — POLYVINYL ALCOHOL 14 MG/ML
1 SOLUTION/ DROPS OPHTHALMIC EVERY 6 HOURS PRN
Status: DISCONTINUED | OUTPATIENT
Start: 2024-08-07 | End: 2024-08-07

## 2024-08-07 RX ORDER — CARBOXYMETHYLCELLULOSE SODIUM 5 MG/ML
1 SOLUTION/ DROPS OPHTHALMIC 4 TIMES DAILY PRN
Status: DISCONTINUED | OUTPATIENT
Start: 2024-08-07 | End: 2024-08-07

## 2024-08-07 RX ADMIN — TAMSULOSIN HYDROCHLORIDE 0.4 MG: 0.4 CAPSULE ORAL at 08:22

## 2024-08-07 RX ADMIN — CARBOXYMETHYLCELLULOSE SODIUM 1 DROP: 0.5 SOLUTION/ DROPS OPHTHALMIC at 16:19

## 2024-08-07 RX ADMIN — FLUTICASONE PROPIONATE 1 SPRAY: 50 SPRAY, METERED NASAL at 08:24

## 2024-08-07 RX ADMIN — ATORVASTATIN CALCIUM 5 MG: 10 TABLET, FILM COATED ORAL at 20:42

## 2024-08-07 RX ADMIN — BUPROPION HYDROCHLORIDE 150 MG: 150 TABLET, FILM COATED, EXTENDED RELEASE ORAL at 08:21

## 2024-08-07 RX ADMIN — LITHIUM CARBONATE 600 MG: 300 CAPSULE, GELATIN COATED ORAL at 20:43

## 2024-08-07 RX ADMIN — CETIRIZINE HYDROCHLORIDE 10 MG: 5 TABLET ORAL at 08:22

## 2024-08-07 RX ADMIN — ACETAMINOPHEN 650 MG: 325 TABLET ORAL at 06:21

## 2024-08-07 RX ADMIN — TRAZODONE HYDROCHLORIDE 100 MG: 100 TABLET ORAL at 20:43

## 2024-08-07 RX ADMIN — BENZTROPINE MESYLATE 0.5 MG: 0.5 TABLET ORAL at 08:31

## 2024-08-07 RX ADMIN — AMLODIPINE BESYLATE 10 MG: 10 TABLET ORAL at 08:23

## 2024-08-07 RX ADMIN — MONTELUKAST 10 MG: 10 TABLET, FILM COATED ORAL at 20:43

## 2024-08-07 RX ADMIN — METFORMIN HYDROCHLORIDE 500 MG: 500 TABLET ORAL at 08:24

## 2024-08-07 RX ADMIN — OLANZAPINE 10 MG: 10 TABLET, ORALLY DISINTEGRATING ORAL at 20:43

## 2024-08-07 RX ADMIN — ACETAMINOPHEN 650 MG: 325 TABLET ORAL at 16:01

## 2024-08-07 RX ADMIN — BENZTROPINE MESYLATE 0.5 MG: 0.5 TABLET ORAL at 20:42

## 2024-08-07 ASSESSMENT — PAIN DESCRIPTION - LOCATION
LOCATION: HEAD
LOCATION: LEG

## 2024-08-07 ASSESSMENT — PAIN DESCRIPTION - DESCRIPTORS
DESCRIPTORS: ACHING
DESCRIPTORS: ACHING

## 2024-08-07 ASSESSMENT — PAIN - FUNCTIONAL ASSESSMENT: PAIN_FUNCTIONAL_ASSESSMENT: ACTIVITIES ARE NOT PREVENTED

## 2024-08-07 ASSESSMENT — PAIN SCALES - GENERAL
PAINLEVEL_OUTOF10: 2
PAINLEVEL_OUTOF10: 3
PAINLEVEL_OUTOF10: 0

## 2024-08-07 ASSESSMENT — PAIN DESCRIPTION - ORIENTATION: ORIENTATION: RIGHT;LEFT

## 2024-08-07 NOTE — PROGRESS NOTES
Psychiatric Progress Note      Patient: Fredy Puente MRN: [id number]  SSN: [id number]    YOB: 1965  Age: 58 y.o.  Sex: male      Admit Date: 8/2/2024       Subjective:     Fredy Puente stated he is feeling better with his mood.  Patient with limited insight about his hospitalization.  No behavioral issues on the unit.  Attending groups and coping skills.  Denied any suicidal or homicidal ideations.  Denied any side effects of medications.    Objective:     Vitals:    08/06/24 0605 08/06/24 0850 08/06/24 1530 08/07/24 0605   BP: 123/71 123/71 115/65 107/65   Pulse: 64  68 64   Resp: 16  16 16   Temp: 97.3 °F (36.3 °C)  98 °F (36.7 °C) 97.8 °F (36.6 °C)   TempSrc: Temporal  Temporal Temporal   SpO2: 97%  95% 98%   Weight:       Height:            Mental Status Exam:     Appearance- poorly groomed  Alert, oriented  to self  Speech -normal rate, volume and rhythm  Mood-anxious  Affect-constricted  Thought process-concrete  Thought content- delusions   Thought perception-auditory hallucinations   No suicidal or homicidal ideations   Insight limited  Judgement limited    MEDICATIONS:  Current Facility-Administered Medications   Medication Dose Route Frequency    [START ON 8/9/2024] risperiDONE ER (PERSERIS) subcutaneous injection 120 mg  120 mg SubCUTAneous Q30 Days    benztropine (COGENTIN) tablet 0.5 mg  0.5 mg Oral BID    acetaminophen (TYLENOL) tablet 650 mg  650 mg Oral Q4H PRN    polyethylene glycol (GLYCOLAX) packet 17 g  17 g Oral Daily PRN    haloperidol (HALDOL) tablet 5 mg  5 mg Oral Q4H PRN    Or    haloperidol lactate (HALDOL) injection 5 mg  5 mg IntraMUSCular Q4H PRN    diphenhydrAMINE (BENADRYL) injection 50 mg  50 mg IntraMUSCular Q4H PRN    aluminum & magnesium hydroxide-simethicone (MAALOX) 200-200-20 MG/5ML suspension 30 mL  30 mL Oral Q6H PRN    nicotine (NICODERM CQ) 14 MG/24HR 1 patch  1 patch TransDERmal Daily    benztropine (COGENTIN) tablet 0.5 mg  0.5 mg Oral

## 2024-08-07 NOTE — GROUP NOTE
Group Therapy Note    Date: 8/7/2024    Group Start Time: 1100  Group End Time: 1130  Group Topic: Medication    [facility]    Anat Sams, RN        Group Therapy Note    Attendees: 3       Patient's Goal:  To stay on meds and go home.    Notes:  Pt is pleasant and cooperative, states he feels better.    Status After Intervention:  Improved    Participation Level: Interactive    Participation Quality: Appropriate      Speech:  normal      Thought Process/Content: Logical      Affective Functioning: Congruent      Mood:  Pleasant      Level of consciousness:  Alert and Oriented x4      Response to Learning: Able to verbalize/acknowledge new learning      Endings: None Reported    Modes of Intervention: Education      Discipline Responsible: Registered Nurse      Signature:  Anat Sams RN

## 2024-08-07 NOTE — GROUP NOTE
Group Therapy Note    Date: 8/7/2024    Group Start Time: 1000  Group End Time: 1045  Group Topic: Community Meeting    [facility]    Prema Gibson        Group Therapy Note    Attendees: 3       Patient's Goal:  To focus on discharge.    Notes:      Status After Intervention:  Improved    Participation Level: Active Listener    Participation Quality: Appropriate      Speech:  normal      Thought Process/Content: Logical      Affective Functioning: Congruent      Mood: anxious      Level of consciousness:  Alert      Response to Learning: Able to verbalize current knowledge/experience      Endings: None Reported    Modes of Intervention: Support      Discipline Responsible: Behavorial Health Tech      Signature:  Prema Gibson

## 2024-08-08 PROCEDURE — 6370000000 HC RX 637 (ALT 250 FOR IP): Performed by: PSYCHIATRY & NEUROLOGY

## 2024-08-08 PROCEDURE — 6370000000 HC RX 637 (ALT 250 FOR IP): Performed by: HOSPITALIST

## 2024-08-08 PROCEDURE — 1240000000 HC EMOTIONAL WELLNESS R&B

## 2024-08-08 RX ADMIN — BENZTROPINE MESYLATE 0.5 MG: 0.5 TABLET ORAL at 08:40

## 2024-08-08 RX ADMIN — TAMSULOSIN HYDROCHLORIDE 0.4 MG: 0.4 CAPSULE ORAL at 08:40

## 2024-08-08 RX ADMIN — FLUTICASONE PROPIONATE 1 SPRAY: 50 SPRAY, METERED NASAL at 08:42

## 2024-08-08 RX ADMIN — HYDROXYZINE PAMOATE 50 MG: 25 CAPSULE ORAL at 14:23

## 2024-08-08 RX ADMIN — CETIRIZINE HYDROCHLORIDE 10 MG: 5 TABLET ORAL at 08:40

## 2024-08-08 RX ADMIN — BUPROPION HYDROCHLORIDE 150 MG: 150 TABLET, FILM COATED, EXTENDED RELEASE ORAL at 08:41

## 2024-08-08 RX ADMIN — BENZTROPINE MESYLATE 0.5 MG: 0.5 TABLET ORAL at 21:05

## 2024-08-08 RX ADMIN — LITHIUM CARBONATE 600 MG: 300 CAPSULE, GELATIN COATED ORAL at 21:04

## 2024-08-08 RX ADMIN — TRAZODONE HYDROCHLORIDE 100 MG: 100 TABLET ORAL at 21:05

## 2024-08-08 RX ADMIN — ATORVASTATIN CALCIUM 5 MG: 10 TABLET, FILM COATED ORAL at 21:05

## 2024-08-08 RX ADMIN — AMLODIPINE BESYLATE 10 MG: 10 TABLET ORAL at 08:41

## 2024-08-08 RX ADMIN — CARBOXYMETHYLCELLULOSE SODIUM 1 DROP: 0.5 SOLUTION/ DROPS OPHTHALMIC at 10:24

## 2024-08-08 RX ADMIN — MONTELUKAST 10 MG: 10 TABLET, FILM COATED ORAL at 21:04

## 2024-08-08 RX ADMIN — METFORMIN HYDROCHLORIDE 500 MG: 500 TABLET ORAL at 08:40

## 2024-08-08 RX ADMIN — OLANZAPINE 10 MG: 10 TABLET, ORALLY DISINTEGRATING ORAL at 21:04

## 2024-08-08 RX ADMIN — ACETAMINOPHEN 650 MG: 325 TABLET ORAL at 21:09

## 2024-08-08 ASSESSMENT — PAIN DESCRIPTION - LOCATION: LOCATION: HEAD

## 2024-08-08 ASSESSMENT — PAIN SCALES - GENERAL
PAINLEVEL_OUTOF10: 5
PAINLEVEL_OUTOF10: 0

## 2024-08-08 NOTE — GROUP NOTE
Group Therapy Note    Date: 8/8/2024    Group Start Time: 1000  Group End Time: 1045  Group Topic: Community Meeting    [facility]    Prema Gibson        Group Therapy Note    Attendees: 3       Patient's Goal:  To FOCUS ON TREATMENT.    Notes:        Status After Intervention:  Improved    Participation Level: Active Listener    Participation Quality: Appropriate      Speech:  normal      Thought Process/Content: Logical      Affective Functioning: Congruent      Mood: anxious      Level of consciousness:  Alert      Response to Learning: Able to verbalize current knowledge/experience      Endings: None Reported    Modes of Intervention: Support      Discipline Responsible: Behavorial Health Tech      Signature:  Prema Gibson

## 2024-08-08 NOTE — PROGRESS NOTES
Psychiatric Progress Note      Patient: Fredy Puente MRN: [id number]  SSN: [id number]    YOB: 1965  Age: 58 y.o.  Sex: male      Admit Date: 8/2/2024       Subjective:     Fredy Puente stated he is feeling anxious about being in the hospital.  Continues to have limited insight about his hospitalization.  No behavioral issues.  Patient with some delusions at baseline.  Denied any auditory visual hallucinations.  Denied any suicidal or homicidal ideations.  Reported good sleep and appetite.    Objective:     Vitals:    08/06/24 1530 08/07/24 0605 08/07/24 1432 08/08/24 0600   BP: 115/65 107/65 125/67 112/68   Pulse: 68 64 64 (!) 45   Resp: 16 16 16 17   Temp: 98 °F (36.7 °C) 97.8 °F (36.6 °C) 97.3 °F (36.3 °C) 97.3 °F (36.3 °C)   TempSrc: Temporal Temporal Temporal Temporal   SpO2: 95% 98% 98% 96%   Weight:       Height:            Mental Status Exam:     Appearance- poorly groomed  Alert, oriented  to self  Speech -normal rate, volume and rhythm  Mood-anxious  Affect-constricted  Thought process-concrete  Thought content- delusions   Thought perception-auditory hallucinations   No suicidal or homicidal ideations   Insight limited  Judgement limited    MEDICATIONS:  Current Facility-Administered Medications   Medication Dose Route Frequency    carboxymethylcellulose PF (REFRESH PLUS) 0.5 % ophthalmic solution 1 drop  1 drop Both Eyes Q6H PRN    [START ON 8/9/2024] risperiDONE ER (PERSERIS) subcutaneous injection 120 mg  120 mg SubCUTAneous Q30 Days    benztropine (COGENTIN) tablet 0.5 mg  0.5 mg Oral BID    acetaminophen (TYLENOL) tablet 650 mg  650 mg Oral Q4H PRN    polyethylene glycol (GLYCOLAX) packet 17 g  17 g Oral Daily PRN    haloperidol (HALDOL) tablet 5 mg  5 mg Oral Q4H PRN    Or    haloperidol lactate (HALDOL) injection 5 mg  5 mg IntraMUSCular Q4H PRN    diphenhydrAMINE (BENADRYL) injection 50 mg  50 mg IntraMUSCular Q4H PRN    aluminum & magnesium hydroxide-simethicone

## 2024-08-08 NOTE — GROUP NOTE
Group Therapy Note    Date: 8/7/2024    Group Start Time: 2000  Group End Time: 2045  Group Topic: Wrap-Up    [facility]    Cindi Rashid CNA        Group Therapy Note    Attendees: 3       Patient's Goal:  To go home    Notes:  pt. Stayed in group to 8;15 pm ate snack and left    Status After Intervention:  Improved    Participation Level: Minimal    Participation Quality: Appropriate      Speech:  normal      Thought Process/Content: Logical      Affective Functioning: Congruent      Mood: depressed      Level of consciousness:  Alert      Response to Learning: Resistant      Endings: None Reported    Modes of Intervention: Activity      Discipline Responsible: Behavorial Health Tech      Signature:  Cindi Rashid CNA

## 2024-08-09 PROCEDURE — 1240000000 HC EMOTIONAL WELLNESS R&B

## 2024-08-09 PROCEDURE — 6370000000 HC RX 637 (ALT 250 FOR IP): Performed by: HOSPITALIST

## 2024-08-09 PROCEDURE — 6370000000 HC RX 637 (ALT 250 FOR IP): Performed by: PSYCHIATRY & NEUROLOGY

## 2024-08-09 RX ADMIN — CARBOXYMETHYLCELLULOSE SODIUM 1 DROP: 0.5 SOLUTION/ DROPS OPHTHALMIC at 11:05

## 2024-08-09 RX ADMIN — BENZTROPINE MESYLATE 0.5 MG: 0.5 TABLET ORAL at 20:45

## 2024-08-09 RX ADMIN — METFORMIN HYDROCHLORIDE 500 MG: 500 TABLET ORAL at 08:55

## 2024-08-09 RX ADMIN — CETIRIZINE HYDROCHLORIDE 10 MG: 5 TABLET ORAL at 08:55

## 2024-08-09 RX ADMIN — OLANZAPINE 10 MG: 10 TABLET, ORALLY DISINTEGRATING ORAL at 20:45

## 2024-08-09 RX ADMIN — TRAZODONE HYDROCHLORIDE 100 MG: 100 TABLET ORAL at 20:45

## 2024-08-09 RX ADMIN — MONTELUKAST 10 MG: 10 TABLET, FILM COATED ORAL at 20:45

## 2024-08-09 RX ADMIN — AMLODIPINE BESYLATE 10 MG: 10 TABLET ORAL at 08:55

## 2024-08-09 RX ADMIN — BUPROPION HYDROCHLORIDE 150 MG: 150 TABLET, FILM COATED, EXTENDED RELEASE ORAL at 08:55

## 2024-08-09 RX ADMIN — ATORVASTATIN CALCIUM 5 MG: 10 TABLET, FILM COATED ORAL at 20:45

## 2024-08-09 RX ADMIN — FLUTICASONE PROPIONATE 1 SPRAY: 50 SPRAY, METERED NASAL at 08:57

## 2024-08-09 RX ADMIN — TAMSULOSIN HYDROCHLORIDE 0.4 MG: 0.4 CAPSULE ORAL at 08:56

## 2024-08-09 RX ADMIN — LITHIUM CARBONATE 600 MG: 300 CAPSULE, GELATIN COATED ORAL at 20:45

## 2024-08-09 RX ADMIN — BENZTROPINE MESYLATE 0.5 MG: 0.5 TABLET ORAL at 08:56

## 2024-08-09 NOTE — GROUP NOTE
Group Therapy Note    Date: 8/8/2024    Group Start Time: 2000  Group End Time: 2045  Group Topic: Wrap-Up    [facility]    Cindi Rashid CNA        Group Therapy Note    Attendees: 4       Patient's Goal:  none    Notes:  participated in group    Status After Intervention:  Improved    Participation Level: Active Listener    Participation Quality: Appropriate      Speech:  normal      Thought Process/Content: Logical      Affective Functioning: Congruent      Mood: anxious      Level of consciousness:  Alert      Response to Learning: Resistant      Endings: None Reported    Modes of Intervention: Activity      Discipline Responsible: Behavorial Health Tech      Signature:  Cindi Rashid CNA

## 2024-08-09 NOTE — GROUP NOTE
Group Therapy Note    Date: 8/9/2024    Group Start Time: 0830  Group End Time: 0915  Group Topic: Nursing    [facility]    Vivian Hoffmann RN        Group Therapy Note    Attendees: 2       Patient's Goal:  Verbalize the meaning of a TDO    Notes:  What does it mean to be Temporarily Detained (TDO'd) and how does it affect me?    Status After Intervention:  Unchanged    Participation Level: Minimal    Participation Quality: Attentive      Speech:  hesitant      Thought Process/Content: Linear      Affective Functioning: Blunted      Mood: euthymic      Level of consciousness:  Alert      Response to Learning: Capable of insight      Endings: None Reported    Modes of Intervention: Education, Socialization, and Reality-testing      Discipline Responsible: Registered Nurse      Signature:  Vivian Hoffmann RN

## 2024-08-09 NOTE — GROUP NOTE
Group Therapy Note    Date: 8/9/2024    Group Start Time: 1000  Group End Time: 1030  Group Topic: Community Meeting    [facility]    Vanessa Sy LPN        Group Therapy Note    Attendees: 2       Patient's Goal: TO EXERCISE.    Notes:  COMMUNITY    Status After Intervention:  Improved    Participation Level: Active Listener    Participation Quality: Appropriate and Attentive      Speech:  normal      Thought Process/Content: Logical      Affective Functioning: Congruent      Mood:  CALM      Level of consciousness:  Alert and Oriented x4      Response to Learning: Able to verbalize current knowledge/experience      Endings: None Reported    Modes of Intervention: Support      Discipline Responsible: Licensed Practical Nurse and Behavorial Health Tech      Signature:  Vanessa Sy LPN

## 2024-08-09 NOTE — PROGRESS NOTES
Psychiatric Progress Note      Patient: Fredy Puente MRN: [id number]  SSN: [id number]    YOB: 1965  Age: 58 y.o.  Sex: male      Admit Date: 8/2/2024       Subjective:     Fredy Puente.  He is feeling good.  Denied any depressive or anxious mood.  No behavioral issues on the unit.  Patient has some delusions at baseline.  Denied any auditory visual hallucinations.  Denied any suicidal or homicidal ideations.  Denied any side effects of medications.    Objective:     Vitals:    08/07/24 1432 08/08/24 0600 08/08/24 1348 08/09/24 0615   BP: 125/67 112/68 123/63 119/72   Pulse: 64 (!) 45 63 54   Resp: 16 17 17 17   Temp: 97.3 °F (36.3 °C) 97.3 °F (36.3 °C) 97.7 °F (36.5 °C) 97.7 °F (36.5 °C)   TempSrc: Temporal Temporal Temporal Temporal   SpO2: 98% 96% 98% 96%   Weight:       Height:            Mental Status Exam:     Appearance- poorly groomed  Alert, oriented  to self  Speech -normal rate, volume and rhythm  Mood-good  Affect-constricted  Thought process-concrete  Thought content- delusions -improving  Thought perception-no auditory hallucinations   No suicidal or homicidal ideations   Insight limited  Judgement limited    MEDICATIONS:  Current Facility-Administered Medications   Medication Dose Route Frequency    carboxymethylcellulose PF (REFRESH PLUS) 0.5 % ophthalmic solution 1 drop  1 drop Both Eyes Q6H PRN    risperiDONE ER (PERSERIS) subcutaneous injection 120 mg  120 mg SubCUTAneous Q30 Days    benztropine (COGENTIN) tablet 0.5 mg  0.5 mg Oral BID    acetaminophen (TYLENOL) tablet 650 mg  650 mg Oral Q4H PRN    polyethylene glycol (GLYCOLAX) packet 17 g  17 g Oral Daily PRN    haloperidol (HALDOL) tablet 5 mg  5 mg Oral Q4H PRN    Or    haloperidol lactate (HALDOL) injection 5 mg  5 mg IntraMUSCular Q4H PRN    diphenhydrAMINE (BENADRYL) injection 50 mg  50 mg IntraMUSCular Q4H PRN    aluminum & magnesium hydroxide-simethicone (MAALOX) 200-200-20 MG/5ML suspension 30 mL  30

## 2024-08-10 PROCEDURE — 6370000000 HC RX 637 (ALT 250 FOR IP): Performed by: PSYCHIATRY & NEUROLOGY

## 2024-08-10 PROCEDURE — 1240000000 HC EMOTIONAL WELLNESS R&B

## 2024-08-10 PROCEDURE — 6370000000 HC RX 637 (ALT 250 FOR IP): Performed by: HOSPITALIST

## 2024-08-10 RX ADMIN — BENZTROPINE MESYLATE 0.5 MG: 0.5 TABLET ORAL at 08:23

## 2024-08-10 RX ADMIN — MONTELUKAST 10 MG: 10 TABLET, FILM COATED ORAL at 20:40

## 2024-08-10 RX ADMIN — OLANZAPINE 10 MG: 10 TABLET, ORALLY DISINTEGRATING ORAL at 20:40

## 2024-08-10 RX ADMIN — BUPROPION HYDROCHLORIDE 150 MG: 150 TABLET, FILM COATED, EXTENDED RELEASE ORAL at 08:23

## 2024-08-10 RX ADMIN — TRAZODONE HYDROCHLORIDE 100 MG: 100 TABLET ORAL at 20:40

## 2024-08-10 RX ADMIN — LITHIUM CARBONATE 600 MG: 300 CAPSULE, GELATIN COATED ORAL at 20:40

## 2024-08-10 RX ADMIN — METFORMIN HYDROCHLORIDE 500 MG: 500 TABLET ORAL at 08:23

## 2024-08-10 RX ADMIN — AMLODIPINE BESYLATE 10 MG: 10 TABLET ORAL at 08:24

## 2024-08-10 RX ADMIN — ATORVASTATIN CALCIUM 5 MG: 10 TABLET, FILM COATED ORAL at 20:40

## 2024-08-10 RX ADMIN — FLUTICASONE PROPIONATE 1 SPRAY: 50 SPRAY, METERED NASAL at 08:24

## 2024-08-10 RX ADMIN — TAMSULOSIN HYDROCHLORIDE 0.4 MG: 0.4 CAPSULE ORAL at 08:23

## 2024-08-10 RX ADMIN — CETIRIZINE HYDROCHLORIDE 10 MG: 5 TABLET ORAL at 08:23

## 2024-08-10 RX ADMIN — BENZTROPINE MESYLATE 0.5 MG: 0.5 TABLET ORAL at 20:40

## 2024-08-10 NOTE — PROGRESS NOTES
B:   Patient alert and oriented x 4.   Pt. States depression is 0.  Pt. States Anxiety is 0.  Pt. denies Hallucinations.  Pt. denies Delusions.  Pt. denies SI.  Pt. denies HI.   Pt. cooperative with Assessment.  Pt.'s behavior Cooperative and Pleasant.  Pt. Has good eye contact.  States he is ready to go home Monday, hoping the drStefano Agrees.  States his appetite is good, been sleeping good and that alone makes him feel better.     I:    If patient is disoriented, reorient pt.  Build trust with patient, by therapeutic listening and Groups.  Encourage pt. To attend and Participate in Groups.  Provide Medications as ordered and needed.  Encourage pt. To be up for all meals and snacks, and consume all of each. Encourage pt. To interact with staff and peers in a positive manner.  Encourage pt. To keep good hygiene.  Q 15 minute safety checks.    R:   Pt. did attend and Participate in Group.  Pt. Is Compliant with Medications   Yes.   Pt. is getting up for meals and snacks.  Pt. Consumes 100% of Meals.  Pt. is, interacting with Peers.   Pt.'s hygiene is Good.  Pt. does not, have any safety issues.    P:   Pt. Will develop and continue to utilize positive Coping skills.  Pt. Will continue to comply with Plan of Care toward Discharge.  Pt. Will continue to stay safe on the unit.

## 2024-08-10 NOTE — GROUP NOTE
Group Therapy Note    Date: 8/9/2024    Group Start Time: 2000  Group End Time: 2045  Group Topic: Wrap-Up    [facility]    [address]        Group Therapy Note    Attendees: 2       Patient's Goal:  n/a    Notes:  happy    Status After Intervention:  Improved    Participation Level: Active Listener    Participation Quality: Supportive      Speech:  normal      Thought Process/Content: Logical      Affective Functioning: Congruent      Mood:  happy      Level of consciousness:  Alert      Response to Learning: Able to verbalize current knowledge/experience      Endings: None Reported    Modes of Intervention: Socialization      Discipline Responsible: Behavorial Health Tech      Signature:  A[address]

## 2024-08-11 PROCEDURE — 1240000000 HC EMOTIONAL WELLNESS R&B

## 2024-08-11 PROCEDURE — 6370000000 HC RX 637 (ALT 250 FOR IP): Performed by: HOSPITALIST

## 2024-08-11 PROCEDURE — 6370000000 HC RX 637 (ALT 250 FOR IP): Performed by: PSYCHIATRY & NEUROLOGY

## 2024-08-11 RX ORDER — CETIRIZINE HYDROCHLORIDE 10 MG/1
10 TABLET ORAL DAILY
Qty: 30 TABLET | Refills: 0 | Status: SHIPPED | OUTPATIENT
Start: 2024-08-11 | End: 2024-09-10

## 2024-08-11 RX ORDER — AMLODIPINE BESYLATE 10 MG/1
10 TABLET ORAL DAILY
Qty: 30 TABLET | Refills: 0 | Status: SHIPPED | OUTPATIENT
Start: 2024-08-11 | End: 2024-09-10

## 2024-08-11 RX ORDER — TRAZODONE HYDROCHLORIDE 100 MG/1
100 TABLET ORAL NIGHTLY
Qty: 30 TABLET | Refills: 0 | Status: SHIPPED | OUTPATIENT
Start: 2024-08-11 | End: 2024-09-10

## 2024-08-11 RX ORDER — BUPROPION HYDROCHLORIDE 150 MG/1
150 TABLET, EXTENDED RELEASE ORAL DAILY
Qty: 30 TABLET | Refills: 0 | Status: SHIPPED | OUTPATIENT
Start: 2024-08-11 | End: 2024-09-10

## 2024-08-11 RX ORDER — LITHIUM CARBONATE 300 MG/1
600 TABLET, FILM COATED, EXTENDED RELEASE ORAL
Qty: 60 TABLET | Refills: 0 | Status: SHIPPED | OUTPATIENT
Start: 2024-08-11 | End: 2024-09-10

## 2024-08-11 RX ORDER — PRAVASTATIN SODIUM 20 MG
20 TABLET ORAL DAILY
Qty: 30 TABLET | Refills: 0 | Status: SHIPPED | OUTPATIENT
Start: 2024-08-11 | End: 2024-09-10

## 2024-08-11 RX ORDER — OLANZAPINE 10 MG/1
10 TABLET ORAL NIGHTLY
Qty: 30 TABLET | Refills: 0 | Status: SHIPPED | OUTPATIENT
Start: 2024-08-11 | End: 2024-09-10

## 2024-08-11 RX ORDER — HYDROXYZINE PAMOATE 50 MG/1
50 CAPSULE ORAL 3 TIMES DAILY PRN
Qty: 30 CAPSULE | Refills: 0 | Status: SHIPPED | OUTPATIENT
Start: 2024-08-11 | End: 2024-08-21

## 2024-08-11 RX ADMIN — FLUTICASONE PROPIONATE 1 SPRAY: 50 SPRAY, METERED NASAL at 08:14

## 2024-08-11 RX ADMIN — OLANZAPINE 10 MG: 10 TABLET, ORALLY DISINTEGRATING ORAL at 20:42

## 2024-08-11 RX ADMIN — BENZTROPINE MESYLATE 0.5 MG: 0.5 TABLET ORAL at 20:43

## 2024-08-11 RX ADMIN — TRAZODONE HYDROCHLORIDE 100 MG: 100 TABLET ORAL at 20:43

## 2024-08-11 RX ADMIN — TAMSULOSIN HYDROCHLORIDE 0.4 MG: 0.4 CAPSULE ORAL at 08:13

## 2024-08-11 RX ADMIN — BENZTROPINE MESYLATE 0.5 MG: 0.5 TABLET ORAL at 08:13

## 2024-08-11 RX ADMIN — BUPROPION HYDROCHLORIDE 150 MG: 150 TABLET, FILM COATED, EXTENDED RELEASE ORAL at 08:13

## 2024-08-11 RX ADMIN — LITHIUM CARBONATE 600 MG: 300 CAPSULE, GELATIN COATED ORAL at 20:43

## 2024-08-11 RX ADMIN — CETIRIZINE HYDROCHLORIDE 10 MG: 5 TABLET ORAL at 08:13

## 2024-08-11 RX ADMIN — ATORVASTATIN CALCIUM 5 MG: 10 TABLET, FILM COATED ORAL at 20:42

## 2024-08-11 RX ADMIN — ACETAMINOPHEN 650 MG: 325 TABLET ORAL at 20:43

## 2024-08-11 RX ADMIN — MONTELUKAST 10 MG: 10 TABLET, FILM COATED ORAL at 20:42

## 2024-08-11 RX ADMIN — AMLODIPINE BESYLATE 10 MG: 10 TABLET ORAL at 08:14

## 2024-08-11 RX ADMIN — METFORMIN HYDROCHLORIDE 500 MG: 500 TABLET ORAL at 08:13

## 2024-08-11 ASSESSMENT — PAIN SCALES - GENERAL
PAINLEVEL_OUTOF10: 6
PAINLEVEL_OUTOF10: 0

## 2024-08-11 ASSESSMENT — PAIN DESCRIPTION - ORIENTATION: ORIENTATION: RIGHT;LEFT

## 2024-08-11 ASSESSMENT — PAIN DESCRIPTION - DESCRIPTORS: DESCRIPTORS: SORE

## 2024-08-11 ASSESSMENT — PAIN DESCRIPTION - LOCATION: LOCATION: LEG

## 2024-08-11 NOTE — GROUP NOTE
Group Therapy Note    Date: 8/11/2024    Group Start Time: 1100  Group End Time: 1145  Group Topic: Nursing    [facility]    Viola Mustafa LPN        Group Therapy Note    Attendees: 4/4       Patient's Goal:  yvonne get home    Notes:  anxiety group:   pt. States he is \"feeling better\"    Status After Intervention:  Unchanged    Participation Level: Active Listener    Participation Quality: Appropriate      Speech:  normal      Thought Process/Content: Logical      Affective Functioning: Congruent      Mood: euthymic      Level of consciousness:  Alert, Oriented x4, and Attentive      Response to Learning: Progressing to goal      Endings: None Reported    Modes of Intervention: Education      Discipline Responsible: Licensed Practical Nurse      Signature:  Viola Mustafa LPN

## 2024-08-11 NOTE — PROGRESS NOTES
Psychiatric Progress Note      Patient: Fredy Puente MRN: [id number]  SSN: [id number]    YOB: 1965  Age: 58 y.o.  Sex: male      Admit Date: 8/2/2024       Subjective:     Fredy Puente.  Kerrie is feeling good.  He denied any depressive or anxious mood.  Denied any suicidal or homicidal ideations.  Patient with some delusions at baseline.  Denied any auditory hallucinations.  No behavioral issues.  Reported good sleep and appetite.    Objective:     Vitals:    08/10/24 0615 08/10/24 1527 08/11/24 0606 08/11/24 1500   BP: 113/75 114/67 109/68 125/75   Pulse: 67 71 61 72   Resp: 16 16 16 16   Temp: 97.8 °F (36.6 °C) 97.7 °F (36.5 °C) 97.8 °F (36.6 °C) 98 °F (36.7 °C)   TempSrc: Temporal Temporal Temporal Temporal   SpO2: 97% 97% 97%    Weight:       Height:            Mental Status Exam:     Appearance-fairly groomed  Alert, oriented  to self  Speech -normal rate, volume and rhythm  Mood-good  Affect-constricted  Thought process-concrete  Thought content- delusions -improving  Thought perception-no auditory hallucinations   No suicidal or homicidal ideations   Insight limited  Judgement limited    MEDICATIONS:  Current Facility-Administered Medications   Medication Dose Route Frequency    carboxymethylcellulose PF (REFRESH PLUS) 0.5 % ophthalmic solution 1 drop  1 drop Both Eyes Q6H PRN    risperiDONE ER (PERSERIS) subcutaneous injection 120 mg  120 mg SubCUTAneous Q30 Days    benztropine (COGENTIN) tablet 0.5 mg  0.5 mg Oral BID    acetaminophen (TYLENOL) tablet 650 mg  650 mg Oral Q4H PRN    polyethylene glycol (GLYCOLAX) packet 17 g  17 g Oral Daily PRN    haloperidol (HALDOL) tablet 5 mg  5 mg Oral Q4H PRN    Or    haloperidol lactate (HALDOL) injection 5 mg  5 mg IntraMUSCular Q4H PRN    diphenhydrAMINE (BENADRYL) injection 50 mg  50 mg IntraMUSCular Q4H PRN    aluminum & magnesium hydroxide-simethicone (MAALOX) 200-200-20 MG/5ML suspension 30 mL  30 mL Oral Q6H PRN    nicotine

## 2024-08-11 NOTE — PROGRESS NOTES
B:   Patient alert and oriented x 4.   Pt. States depression is 0.  Pt. States Anxiety is 0.  Pt. denies Hallucinations.  Pt. denies Delusions.  Pt. denies SI.  Pt. denies HI.   Pt. cooperative with Assessment.  Pt.'s behavior Cooperative and Pleasant.       I:    If patient is disoriented, reorient pt.  Build trust with patient, by therapeutic listening and Groups.  Encourage pt. To attend and Participate in Groups.  Provide Medications as ordered and needed.  Encourage pt. To be up for all meals and snacks, and consume all of each. Encourage pt. To interact with staff and peers in a positive manner.  Encourage pt. To keep good hygiene.  Q 15 minute safety checks.    R:   Pt. did attend and Participate in Group.  Pt. Is Compliant with Medications   Yes.   Pt. is getting up for meals and snacks.  Pt. Consumes 100% of Meals.  Pt. is, interacting with Peers.   Pt.'s hygiene is Good.  Pt. does not, have any safety issues.    P:   Pt. Will develop and continue to utilize positive Coping skills.  Pt. Will continue to comply with Plan of Care toward Discharge.  Pt. Will continue to stay safe on the unit.

## 2024-08-11 NOTE — PROGRESS NOTES
Psychiatric Progress Note      Patient: Fredy Puente MRN: [id number]  SSN: [id number]    YOB: 1965  Age: 58 y.o.  Sex: male      Admit Date: 8/2/2024       Subjective:     Fredy Puente.  Overall significant improvement with his mood and psychotic symptoms compared to admission.  Any suicidal or homicidal ideations.  In groups.  Reported good sleep and appetite.  No behavioral issues are needed.  Denied any side effects of medications.      Objective:     Vitals:    08/09/24 0615 08/09/24 1500 08/10/24 0615 08/10/24 1527   BP: 119/72 111/68 113/75 114/67   Pulse: 54 66 67 71   Resp: 17 20 16 16   Temp: 97.7 °F (36.5 °C) 98.2 °F (36.8 °C) 97.8 °F (36.6 °C) 97.7 °F (36.5 °C)   TempSrc: Temporal Temporal Temporal Temporal   SpO2: 96% 95% 97% 97%   Weight:       Height:            Mental Status Exam:     Appearance-fairly groomed  Alert, oriented  to self  Speech -normal rate, volume and rhythm  Mood-good  Affect-constricted  Thought process-concrete  Thought content- delusions -improving  Thought perception-no auditory hallucinations   No suicidal or homicidal ideations   Insight limited  Judgement limited    MEDICATIONS:  Current Facility-Administered Medications   Medication Dose Route Frequency    carboxymethylcellulose PF (REFRESH PLUS) 0.5 % ophthalmic solution 1 drop  1 drop Both Eyes Q6H PRN    risperiDONE ER (PERSERIS) subcutaneous injection 120 mg  120 mg SubCUTAneous Q30 Days    benztropine (COGENTIN) tablet 0.5 mg  0.5 mg Oral BID    acetaminophen (TYLENOL) tablet 650 mg  650 mg Oral Q4H PRN    polyethylene glycol (GLYCOLAX) packet 17 g  17 g Oral Daily PRN    haloperidol (HALDOL) tablet 5 mg  5 mg Oral Q4H PRN    Or    haloperidol lactate (HALDOL) injection 5 mg  5 mg IntraMUSCular Q4H PRN    diphenhydrAMINE (BENADRYL) injection 50 mg  50 mg IntraMUSCular Q4H PRN    aluminum & magnesium hydroxide-simethicone (MAALOX) 200-200-20 MG/5ML suspension 30 mL  30 mL Oral Q6H PRN

## 2024-08-12 VITALS
DIASTOLIC BLOOD PRESSURE: 78 MMHG | HEART RATE: 63 BPM | HEIGHT: 71 IN | TEMPERATURE: 97.7 F | RESPIRATION RATE: 17 BRPM | WEIGHT: 210.4 LBS | OXYGEN SATURATION: 97 % | SYSTOLIC BLOOD PRESSURE: 121 MMHG | BODY MASS INDEX: 29.46 KG/M2

## 2024-08-12 PROCEDURE — 6370000000 HC RX 637 (ALT 250 FOR IP): Performed by: HOSPITALIST

## 2024-08-12 PROCEDURE — 6370000000 HC RX 637 (ALT 250 FOR IP): Performed by: PSYCHIATRY & NEUROLOGY

## 2024-08-12 RX ADMIN — CETIRIZINE HYDROCHLORIDE 10 MG: 5 TABLET ORAL at 09:12

## 2024-08-12 RX ADMIN — AMLODIPINE BESYLATE 10 MG: 10 TABLET ORAL at 09:12

## 2024-08-12 RX ADMIN — FLUTICASONE PROPIONATE 1 SPRAY: 50 SPRAY, METERED NASAL at 09:37

## 2024-08-12 RX ADMIN — BENZTROPINE MESYLATE 0.5 MG: 0.5 TABLET ORAL at 09:12

## 2024-08-12 RX ADMIN — TAMSULOSIN HYDROCHLORIDE 0.4 MG: 0.4 CAPSULE ORAL at 09:12

## 2024-08-12 RX ADMIN — BUPROPION HYDROCHLORIDE 150 MG: 150 TABLET, FILM COATED, EXTENDED RELEASE ORAL at 09:12

## 2024-08-12 RX ADMIN — METFORMIN HYDROCHLORIDE 500 MG: 500 TABLET ORAL at 09:12

## 2024-08-12 ASSESSMENT — PAIN SCALES - GENERAL: PAINLEVEL_OUTOF10: 0

## 2024-08-12 NOTE — GROUP NOTE
Group Therapy Note    Date: 8/12/2024    Group Start Time: 1000  Group End Time: 1035  Group Topic: Community Meeting    [facility]    Prema Gibson        Group Therapy Note    Attendees: 3       Patient's Goal:  To Discharge    Notes:      Status After Intervention:  Improved    Participation Level: Active Listener    Participation Quality: Appropriate      Speech:  normal      Thought Process/Content: Logical      Affective Functioning: Congruent      Mood: anxious      Level of consciousness:  Alert      Response to Learning: Able to verbalize current knowledge/experience      Endings: None Reported    Modes of Intervention: Support      Discipline Responsible: Behavorial Health Tech      Signature:  Prema Gibson

## 2024-08-12 NOTE — PROGRESS NOTES
Psychiatric Progress Note      Patient: Fredy Puente MRN: [id number]  SSN: [id number]    YOB: 1965  Age: 58 y.o.  Sex: male      Admit Date: 8/2/2024       Subjective:     Fredy Puente.  Overall significant improvement with his mood and psychotic symptoms compared to admission.  No behavioral issues on the unit.  Some improvement with his insight compared to admission.  Attending groups.  He reported good sleep and appetite.  Denied any suicidal or homicidal ideations.  Denied any side effects of medications.    Objective:     Vitals:    08/10/24 1527 08/11/24 0606 08/11/24 1500 08/12/24 0553   BP: 114/67 109/68 125/75 121/78   Pulse: 71 61 72 63   Resp: 16 16 16 17   Temp: 97.7 °F (36.5 °C) 97.8 °F (36.6 °C) 98 °F (36.7 °C) 97.7 °F (36.5 °C)   TempSrc: Temporal Temporal Temporal Temporal   SpO2: 97% 97%  97%   Weight:       Height:            Mental Status Exam:     Appearance-fairly groomed  Alert, oriented  to self  Speech -normal rate, volume and rhythm  Mood-good  Affect-constricted  Thought process-concrete  Thought content- delusions -improving  Thought perception-no auditory hallucinations   No suicidal or homicidal ideations   Insight limited  Judgement limited    MEDICATIONS:  Current Facility-Administered Medications   Medication Dose Route Frequency    carboxymethylcellulose PF (REFRESH PLUS) 0.5 % ophthalmic solution 1 drop  1 drop Both Eyes Q6H PRN    risperiDONE ER (PERSERIS) subcutaneous injection 120 mg  120 mg SubCUTAneous Q30 Days    benztropine (COGENTIN) tablet 0.5 mg  0.5 mg Oral BID    acetaminophen (TYLENOL) tablet 650 mg  650 mg Oral Q4H PRN    polyethylene glycol (GLYCOLAX) packet 17 g  17 g Oral Daily PRN    haloperidol (HALDOL) tablet 5 mg  5 mg Oral Q4H PRN    Or    haloperidol lactate (HALDOL) injection 5 mg  5 mg IntraMUSCular Q4H PRN    diphenhydrAMINE (BENADRYL) injection 50 mg  50 mg IntraMUSCular Q4H PRN    aluminum & magnesium

## 2024-08-12 NOTE — DISCHARGE SUMMARY
on nature of patient's pathology/ies and treatment compliance issues.  Prognosis is greatly dependent upon patient's ability follow discharge recommendations, take medications as described, and follow up with scheduled appointments.             DISCHARGE MEDICATIONS:     Informed consent given for the use of following psychotropic medications:     Medication List        START taking these medications      risperiDONE  MG Prsy subcutaneous injection  Commonly known as: PERSERIS  Inject 0.8 mLs into the skin every 30 days  Start taking on: September 8, 2024            CHANGE how you take these medications      hydrOXYzine pamoate 50 MG capsule  Commonly known as: VISTARIL  Take 1 capsule by mouth 3 times daily as needed for Anxiety  What changed: when to take this            CONTINUE taking these medications      amLODIPine 10 MG tablet  Commonly known as: NORVASC  Take 1 tablet by mouth daily     buPROPion 150 MG extended release tablet  Commonly known as: WELLBUTRIN SR  Take 1 tablet by mouth daily     cetirizine 10 MG tablet  Commonly known as: ZYRTEC  Take 1 tablet by mouth daily     fluticasone 50 MCG/ACT nasal spray  Commonly known as: FLONASE     lithium 300 MG extended release tablet  Commonly known as: LITHOBID  Take 2 tablets by mouth nightly     metFORMIN 500 MG tablet  Commonly known as: GLUCOPHAGE  Take 1 tablet by mouth daily (with breakfast)     montelukast 10 MG tablet  Commonly known as: SINGULAIR     OLANZapine 10 MG tablet  Commonly known as: ZYPREXA  Take 1 tablet by mouth nightly     pravastatin 20 MG tablet  Commonly known as: PRAVACHOL  Take 1 tablet by mouth daily     tamsulosin 0.4 MG capsule  Commonly known as: FLOMAX     traZODone 100 MG tablet  Commonly known as: DESYREL  Take 1 tablet by mouth nightly            STOP taking these medications      Vitamin D3 50 MCG (2000 UT) Tabs               Where to Get Your Medications        These medications were sent to [facility]/pharmacy #[id number] -

## 2024-08-12 NOTE — GROUP NOTE
Group Therapy Note    Date: 8/11/2024    Group Start Time: 2000  Group End Time: 2045  Group Topic: Wrap-Up    [facility]    [address]        Group Therapy Note    Attendees: 4       Patient's Goal:  n/a    Notes:  happy    Status After Intervention:  Improved    Participation Level: Active Listener    Participation Quality: Supportive      Speech:  normal      Thought Process/Content: Logical      Affective Functioning: Congruent      Mood:  happy      Level of consciousness:  Alert      Response to Learning: Able to verbalize current knowledge/experience      Endings: None Reported    Modes of Intervention: Socialization      Discipline Responsible: [employer]      Signature:  A[address]

## 2024-08-13 NOTE — BH NOTE
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      Pt. Escorted to front by staff for transportation by car, to home.   
Pt. Has been up for groups and meals, walking in hallway at times, pleasant, no safety issues noted. Q15 minute safety checks continue.  
Pt. Has been up for groups and meals, walking throughout the unit remainder of the day along with intermittent resting in bed with eyes closed.  No safety issues noted q 1 minute safety checks continue.  
TREATMENT TEAM COMPLETED     Attending meeting was as follows:  Patient, STAN Hector, Writer, and Dr Cruz.       Meeting was conducted via Skype      Daily Treatment Team consists of the following:     Pt. Cognitive status:  Awake    Pt. Attending Groups: Yes    Pt. Participating in groups:  Yes    Pt. Homicidal / Suicidal: normal    Pt. Behaviors:  Cooperative and Pleasant    Pt. Compliant with Medications:  Yes          New Medication orders:  No      Discharge Plan:  Home   
TREATMENT TEAM COMPLETED     Attending meeting was as follows:  Patient, STAN Hector, Writer, and Dr Cruz.       Meeting was conducted via Skype      Daily Treatment Team consists of the following:     Pt. Cognitive status:  Awake    Pt. Attending Groups: Yes    Pt. Participating in groups:  Yes    Pt. Homicidal / Suicidal: normal    Pt. Behaviors:  Cooperative and Pleasant    Pt. Compliant with Medications:  Yes          New Medication orders:  No      Discharge Plan:  Home, Discharge for today   
TREATMENT TEAM COMPLETED     Attending meeting was as follows:  Patient, Writer, and Dr. Cruz.       Meeting was conducted via [employer]      Daily Treatment Team consists of the following:     Pt. Cognitive status:  Awake    Pt. Attending Groups: Yes    Pt. Participating in groups:  Yes    Pt. Homicidal / Suicidal: normal    Pt. Behaviors:  Cooperative    Pt. Compliant with Medications:  Yes          New Medication orders:  No      Discharge Plan:  Home   
TREATMENT TEAM COMPLETED     Attending meeting was as follows:  Patient, Writer, and Dr. Cruz.  Meeting was conducted via telehealth.    Daily Treatment Team consists of the following:     Pt. Cognitive status:  Awake    Pt. Attending Groups: Yes    Pt. Participating in groups:  Yes    Pt. Homicidal / Suicidal: normal    Pt. Behaviors:  Anxious, Excessive Activity/Restless, Disorganized, and Cooperative    Pt. Compliant with Medications:  Yes  Patient has Long acting IM injection scheduled for tomorrow.        New Medication orders:  No      Discharge Plan:  Home on Monday to follow-up as outpatient with [facility] [id number].    
TREATMENT TEAM COMPLETED    Attending meeting was as follows:  Patient, Writer, and Dr. Cruz.  Meeting was conducted via telehealth.    Daily Treatment Team consists of the following:     Pt. Cognitive status:  Anxious    Pt. Attending Groups: Yes    Pt. Participating in groups:  Yes    Pt. Homicidal / Suicidal: tangential    Pt. Behaviors:  Anxious, Disorganized, Imulsive, Cooperative, and Pleasant    Pt. Compliant with Medications:  Yes  Pt perseverative regarding his medications.  Specifically talks about Cogentin frequently.      New Medication orders:  Yes  Restarted pt's home medications per MD order.      Discharge Plan:  Home in the care of his sister, Zaida, who is the patient's payee and power of .      
TREATMENT TEAM COMPLETED    Attending meeting was as follows:  Patient, Writer, and Dr. Cruz.  Meeting was conducted via telehealth.    Daily Treatment Team consists of the following:     Pt. Cognitive status:  Awake    Pt. Attending Groups: Yes    Pt. Participating in groups:  Yes    Pt. Homicidal / Suicidal: normal    Pt. Behaviors:  Excessive Activity/Restless, Imulsive, Cooperative, and Pleasant    Pt. Compliant with Medications:  Yes          New Medication orders:  No      Discharge Plan:  Home with sister to follow up outpatient with added DD skill-building services through [facility] [id number].  Tolerated Perseris SQ injection as ordered today and planned discharge is for Monday.        
Writer spoke with Janet from Pharmacy.   ER had ordered PERSERIS for the pt. However, Janet told them with our program they have to be inpatient.  Order was discontinued, injection not administered.    Writer verified with Janet we do have the PERSERIS 120mg  in stock.  Dr. Nancy hugo.  
Contact: N/A    Health issues:     Trauma history: None reported.     Legal issues: None reported.     History of  service: None reported.     Financial status: Disability     Bahai/cultural factors: Uatsdin    Education/work history: Graduated high school    Have you been licensed as a health care professional (current or ): No    Describe coping skills: Pt reports that he enjoys going to restaurants and enjoys going to [employer] and music.   Cinthia Cueva  2024

## 2024-09-26 ENCOUNTER — APPOINTMENT (OUTPATIENT)
Facility: HOSPITAL | Age: 59
End: 2024-09-26
Payer: MEDICARE

## 2024-09-26 ENCOUNTER — HOSPITAL ENCOUNTER (EMERGENCY)
Facility: HOSPITAL | Age: 59
Discharge: HOME | End: 2024-09-26
Attending: STUDENT IN AN ORGANIZED HEALTH CARE EDUCATION/TRAINING PROGRAM
Payer: MEDICARE

## 2024-09-26 VITALS
SYSTOLIC BLOOD PRESSURE: 155 MMHG | DIASTOLIC BLOOD PRESSURE: 90 MMHG | HEIGHT: 71 IN | WEIGHT: 210 LBS | BODY MASS INDEX: 29.4 KG/M2 | TEMPERATURE: 98.3 F | HEART RATE: 85 BPM | RESPIRATION RATE: 16 BRPM | OXYGEN SATURATION: 94 %

## 2024-09-26 DIAGNOSIS — J44.1 COPD EXACERBATION (HCC): Primary | ICD-10-CM

## 2024-09-26 LAB
ALBUMIN SERPL-MCNC: 3.8 G/DL (ref 3.5–5)
ALBUMIN/GLOB SERPL: 1.3 (ref 1.1–2.2)
ALP SERPL-CCNC: 90 U/L (ref 45–117)
ALT SERPL-CCNC: 20 U/L (ref 12–78)
ANION GAP SERPL CALC-SCNC: 6 MMOL/L (ref 2–12)
AST SERPL W P-5'-P-CCNC: 10 U/L (ref 15–37)
BASOPHILS # BLD: 0 K/UL (ref 0–0.1)
BASOPHILS NFR BLD: 1 % (ref 0–1)
BILIRUB SERPL-MCNC: 0.6 MG/DL (ref 0.2–1)
BUN SERPL-MCNC: 12 MG/DL (ref 6–20)
BUN/CREAT SERPL: 13 (ref 12–20)
CA-I BLD-MCNC: 9 MG/DL (ref 8.5–10.1)
CHLORIDE SERPL-SCNC: 112 MMOL/L (ref 97–108)
CO2 SERPL-SCNC: 24 MMOL/L (ref 21–32)
CREAT SERPL-MCNC: 0.89 MG/DL (ref 0.7–1.3)
DIFFERENTIAL METHOD BLD: NORMAL
EOSINOPHIL # BLD: 0.2 K/UL (ref 0–0.4)
EOSINOPHIL NFR BLD: 4 % (ref 0–7)
ERYTHROCYTE [DISTWIDTH] IN BLOOD BY AUTOMATED COUNT: 11.9 % (ref 11.5–14.5)
GLOBULIN SER CALC-MCNC: 3 G/DL (ref 2–4)
GLUCOSE SERPL-MCNC: 123 MG/DL (ref 65–100)
HCT VFR BLD AUTO: 40.9 % (ref 36.6–50.3)
HGB BLD-MCNC: 13.4 G/DL (ref 12.1–17)
IMM GRANULOCYTES # BLD AUTO: 0 K/UL (ref 0–0.04)
IMM GRANULOCYTES NFR BLD AUTO: 0 % (ref 0–0.5)
LYMPHOCYTES # BLD: 1.8 K/UL (ref 0.8–3.5)
LYMPHOCYTES NFR BLD: 33 % (ref 12–49)
MAGNESIUM SERPL-MCNC: 1.8 MG/DL (ref 1.6–2.4)
MCH RBC QN AUTO: 28.5 PG (ref 26–34)
MCHC RBC AUTO-ENTMCNC: 32.8 G/DL (ref 30–36.5)
MCV RBC AUTO: 86.8 FL (ref 80–99)
MONOCYTES # BLD: 0.4 K/UL (ref 0–1)
MONOCYTES NFR BLD: 8 % (ref 5–13)
NEUTS SEG # BLD: 3 K/UL (ref 1.8–8)
NEUTS SEG NFR BLD: 54 % (ref 32–75)
NRBC # BLD: 0 K/UL (ref 0–0.01)
NRBC BLD-RTO: 0 PER 100 WBC
PLATELET # BLD AUTO: 202 K/UL (ref 150–400)
PMV BLD AUTO: 10.4 FL (ref 8.9–12.9)
POTASSIUM SERPL-SCNC: 3.8 MMOL/L (ref 3.5–5.1)
PROT SERPL-MCNC: 6.8 G/DL (ref 6.4–8.2)
RBC # BLD AUTO: 4.71 M/UL (ref 4.1–5.7)
SODIUM SERPL-SCNC: 142 MMOL/L (ref 136–145)
TROPONIN I SERPL HS-MCNC: 8 NG/L (ref 0–76)
TROPONIN I SERPL HS-MCNC: 8 NG/L (ref 0–76)
WBC # BLD AUTO: 5.5 K/UL (ref 4.1–11.1)

## 2024-09-26 PROCEDURE — 85025 COMPLETE CBC W/AUTO DIFF WBC: CPT

## 2024-09-26 PROCEDURE — 6360000002 HC RX W HCPCS: Performed by: STUDENT IN AN ORGANIZED HEALTH CARE EDUCATION/TRAINING PROGRAM

## 2024-09-26 PROCEDURE — 6370000000 HC RX 637 (ALT 250 FOR IP): Performed by: STUDENT IN AN ORGANIZED HEALTH CARE EDUCATION/TRAINING PROGRAM

## 2024-09-26 PROCEDURE — 83735 ASSAY OF MAGNESIUM: CPT

## 2024-09-26 PROCEDURE — 93005 ELECTROCARDIOGRAM TRACING: CPT | Performed by: STUDENT IN AN ORGANIZED HEALTH CARE EDUCATION/TRAINING PROGRAM

## 2024-09-26 PROCEDURE — 96374 THER/PROPH/DIAG INJ IV PUSH: CPT

## 2024-09-26 PROCEDURE — 71046 X-RAY EXAM CHEST 2 VIEWS: CPT

## 2024-09-26 PROCEDURE — 36415 COLL VENOUS BLD VENIPUNCTURE: CPT

## 2024-09-26 PROCEDURE — 80053 COMPREHEN METABOLIC PANEL: CPT

## 2024-09-26 PROCEDURE — 99285 EMERGENCY DEPT VISIT HI MDM: CPT

## 2024-09-26 PROCEDURE — 94640 AIRWAY INHALATION TREATMENT: CPT

## 2024-09-26 PROCEDURE — 84484 ASSAY OF TROPONIN QUANT: CPT

## 2024-09-26 PROCEDURE — 94762 N-INVAS EAR/PLS OXIMTRY CONT: CPT

## 2024-09-26 RX ORDER — LEVOFLOXACIN 750 MG/1
750 TABLET, FILM COATED ORAL DAILY
Qty: 7 TABLET | Refills: 0 | Status: SHIPPED | OUTPATIENT
Start: 2024-09-26 | End: 2024-10-03

## 2024-09-26 RX ORDER — DEXAMETHASONE SODIUM PHOSPHATE 10 MG/ML
6 INJECTION, SOLUTION INTRAMUSCULAR; INTRAVENOUS ONCE
Status: COMPLETED | OUTPATIENT
Start: 2024-09-26 | End: 2024-09-26

## 2024-09-26 RX ORDER — IPRATROPIUM BROMIDE AND ALBUTEROL SULFATE 2.5; .5 MG/3ML; MG/3ML
1 SOLUTION RESPIRATORY (INHALATION) ONCE
Status: COMPLETED | OUTPATIENT
Start: 2024-09-26 | End: 2024-09-26

## 2024-09-26 RX ADMIN — DEXAMETHASONE SODIUM PHOSPHATE 6 MG: 10 INJECTION, SOLUTION INTRAMUSCULAR; INTRAVENOUS at 02:06

## 2024-09-26 RX ADMIN — IPRATROPIUM BROMIDE AND ALBUTEROL SULFATE 1 DOSE: 2.5; .5 SOLUTION RESPIRATORY (INHALATION) at 01:53

## 2024-09-26 RX ADMIN — LEVOFLOXACIN 750 MG: 500 TABLET, FILM COATED ORAL at 02:05

## 2024-09-26 ASSESSMENT — PAIN - FUNCTIONAL ASSESSMENT: PAIN_FUNCTIONAL_ASSESSMENT: NONE - DENIES PAIN

## 2024-09-27 LAB
EKG ATRIAL RATE: 68 BPM
EKG DIAGNOSIS: NORMAL
EKG P AXIS: 39 DEGREES
EKG P-R INTERVAL: 124 MS
EKG Q-T INTERVAL: 404 MS
EKG QRS DURATION: 86 MS
EKG QTC CALCULATION (BAZETT): 429 MS
EKG R AXIS: 39 DEGREES
EKG T AXIS: 28 DEGREES
EKG VENTRICULAR RATE: 68 BPM

## 2025-01-09 ENCOUNTER — HOSPITAL ENCOUNTER (INPATIENT)
Facility: HOSPITAL | Age: 60
LOS: 2 days | Discharge: HOME | End: 2025-01-11
Attending: EMERGENCY MEDICINE | Admitting: STUDENT IN AN ORGANIZED HEALTH CARE EDUCATION/TRAINING PROGRAM
Payer: MEDICARE

## 2025-01-09 ENCOUNTER — APPOINTMENT (OUTPATIENT)
Facility: HOSPITAL | Age: 60
End: 2025-01-09
Payer: MEDICARE

## 2025-01-09 DIAGNOSIS — J10.1 INFLUENZA A: Primary | ICD-10-CM

## 2025-01-09 DIAGNOSIS — J96.01 ACUTE RESPIRATORY FAILURE WITH HYPOXIA: ICD-10-CM

## 2025-01-09 LAB
ALBUMIN SERPL-MCNC: 4.2 G/DL (ref 3.5–5)
ALBUMIN/GLOB SERPL: 1.2 (ref 1.1–2.2)
ALP SERPL-CCNC: 88 U/L (ref 45–117)
ALT SERPL-CCNC: 38 U/L (ref 12–78)
ANION GAP SERPL CALC-SCNC: 6 MMOL/L (ref 2–12)
ARTERIAL PATENCY WRIST A: YES
AST SERPL W P-5'-P-CCNC: 23 U/L (ref 15–37)
BASE DEFICIT BLDA-SCNC: 1.3 MMOL/L
BASE EXCESS BLD CALC-SCNC: 0.8 MMOL/L
BASOPHILS # BLD: 0.03 K/UL (ref 0–0.1)
BASOPHILS NFR BLD: 0.4 % (ref 0–1)
BDY SITE: ABNORMAL
BILIRUB SERPL-MCNC: 0.6 MG/DL (ref 0.2–1)
BNP SERPL-MCNC: 43 PG/ML
BODY TEMPERATURE: 98.6
BUN SERPL-MCNC: 15 MG/DL (ref 6–20)
BUN/CREAT SERPL: 14 (ref 12–20)
CA-I BLD-MCNC: 1.21 MMOL/L (ref 1.12–1.32)
CA-I BLD-MCNC: 9.2 MG/DL (ref 8.5–10.1)
CHLORIDE BLD-SCNC: 109 MMOL/L (ref 98–107)
CHLORIDE SERPL-SCNC: 110 MMOL/L (ref 97–108)
CO2 BLD-SCNC: 26 MMOL/L
CO2 SERPL-SCNC: 25 MMOL/L (ref 21–32)
COHGB MFR BLD: 0.9 % (ref 1–2)
CREAT SERPL-MCNC: 1.07 MG/DL (ref 0.7–1.3)
CREAT UR-MCNC: 1 MG/DL (ref 0.6–1.3)
DIFFERENTIAL METHOD BLD: ABNORMAL
EKG ATRIAL RATE: 113 BPM
EKG DIAGNOSIS: NORMAL
EKG P AXIS: 49 DEGREES
EKG P-R INTERVAL: 136 MS
EKG Q-T INTERVAL: 280 MS
EKG QRS DURATION: 88 MS
EKG QTC CALCULATION (BAZETT): 384 MS
EKG R AXIS: 50 DEGREES
EKG T AXIS: 37 DEGREES
EKG VENTRICULAR RATE: 113 BPM
EOSINOPHIL # BLD: 0.08 K/UL (ref 0–0.4)
EOSINOPHIL NFR BLD: 1 % (ref 0–7)
ERYTHROCYTE [DISTWIDTH] IN BLOOD BY AUTOMATED COUNT: 12.5 % (ref 11.5–14.5)
FIO2 ON VENT: 21 %
FLUAV RNA SPEC QL NAA+PROBE: DETECTED
FLUBV RNA SPEC QL NAA+PROBE: NOT DETECTED
GLOBULIN SER CALC-MCNC: 3.4 G/DL (ref 2–4)
GLUCOSE BLD STRIP.AUTO-MCNC: 105 MG/DL (ref 65–100)
GLUCOSE SERPL-MCNC: 101 MG/DL (ref 65–100)
HCO3 BLD-SCNC: 26.7 MMOL/L (ref 19–28)
HCO3 BLDA-SCNC: 22 MMOL/L (ref 22–26)
HCT VFR BLD AUTO: 42.5 % (ref 36.6–50.3)
HGB BLD-MCNC: 13.8 G/DL (ref 12.1–17)
IMM GRANULOCYTES # BLD AUTO: 0.1 K/UL (ref 0–0.04)
IMM GRANULOCYTES NFR BLD AUTO: 1.2 % (ref 0–0.5)
LACTATE BLD-SCNC: 1.02 MMOL/L (ref 0.4–2)
LYMPHOCYTES # BLD: 0.3 K/UL (ref 0.8–3.5)
LYMPHOCYTES NFR BLD: 3.6 % (ref 12–49)
MAGNESIUM SERPL-MCNC: 1.6 MG/DL (ref 1.6–2.4)
MCH RBC QN AUTO: 28.1 PG (ref 26–34)
MCHC RBC AUTO-ENTMCNC: 32.5 G/DL (ref 30–36.5)
MCV RBC AUTO: 86.6 FL (ref 80–99)
METHGB MFR BLD: 0.3 % (ref 0–1.4)
MONOCYTES # BLD: 0.5 K/UL (ref 0–1)
MONOCYTES NFR BLD: 6 % (ref 5–13)
NEUTS SEG # BLD: 7.31 K/UL (ref 1.8–8)
NEUTS SEG NFR BLD: 87.8 % (ref 32–75)
NRBC # BLD: 0 K/UL (ref 0–0.01)
NRBC BLD-RTO: 0 PER 100 WBC
OXYHGB MFR BLD: 88.1 % (ref 95–99)
PCO2 BLD: 46.6 MMHG (ref 35–45)
PCO2 BLDA: 35 MMHG (ref 35–45)
PERFORMED BY:: ABNORMAL
PERFORMED BY:: ABNORMAL
PH BLD: 7.37 (ref 7.35–7.45)
PH BLDA: 7.43 (ref 7.35–7.45)
PLATELET # BLD AUTO: 195 K/UL (ref 150–400)
PMV BLD AUTO: 10 FL (ref 8.9–12.9)
PO2 BLD: <27 MMHG (ref 75–100)
PO2 BLDA: 53 MMHG (ref 80–100)
POTASSIUM BLD-SCNC: 4.1 MMOL/L (ref 3.5–5.5)
POTASSIUM SERPL-SCNC: 4.2 MMOL/L (ref 3.5–5.1)
PROCALCITONIN SERPL-MCNC: 1.03 NG/ML
PROT SERPL-MCNC: 7.6 G/DL (ref 6.4–8.2)
RBC # BLD AUTO: 4.91 M/UL (ref 4.1–5.7)
SAO2 % BLD: 89 % (ref 95–99)
SAO2% DEVICE SAO2% SENSOR NAME: ABNORMAL
SARS-COV-2 RNA RESP QL NAA+PROBE: NOT DETECTED
SODIUM BLD-SCNC: 144 MMOL/L (ref 136–145)
SODIUM SERPL-SCNC: 141 MMOL/L (ref 136–145)
SPECIMEN SITE: ABNORMAL
SPECIMEN SITE: ABNORMAL
TROPONIN I SERPL HS-MCNC: 9 NG/L (ref 0–76)
WBC # BLD AUTO: 8.3 K/UL (ref 4.1–11.1)

## 2025-01-09 PROCEDURE — 87641 MR-STAPH DNA AMP PROBE: CPT

## 2025-01-09 PROCEDURE — 84295 ASSAY OF SERUM SODIUM: CPT

## 2025-01-09 PROCEDURE — 84145 PROCALCITONIN (PCT): CPT

## 2025-01-09 PROCEDURE — 93005 ELECTROCARDIOGRAM TRACING: CPT | Performed by: EMERGENCY MEDICINE

## 2025-01-09 PROCEDURE — 94660 CPAP INITIATION&MGMT: CPT

## 2025-01-09 PROCEDURE — 83735 ASSAY OF MAGNESIUM: CPT

## 2025-01-09 PROCEDURE — 2000000000 HC ICU R&B

## 2025-01-09 PROCEDURE — 85025 COMPLETE CBC W/AUTO DIFF WBC: CPT

## 2025-01-09 PROCEDURE — 2500000003 HC RX 250 WO HCPCS: Performed by: EMERGENCY MEDICINE

## 2025-01-09 PROCEDURE — 6370000000 HC RX 637 (ALT 250 FOR IP): Performed by: EMERGENCY MEDICINE

## 2025-01-09 PROCEDURE — 6370000000 HC RX 637 (ALT 250 FOR IP): Performed by: INTERNAL MEDICINE

## 2025-01-09 PROCEDURE — 87040 BLOOD CULTURE FOR BACTERIA: CPT

## 2025-01-09 PROCEDURE — 6360000002 HC RX W HCPCS: Performed by: INTERNAL MEDICINE

## 2025-01-09 PROCEDURE — 71045 X-RAY EXAM CHEST 1 VIEW: CPT

## 2025-01-09 PROCEDURE — 94640 AIRWAY INHALATION TREATMENT: CPT

## 2025-01-09 PROCEDURE — 82803 BLOOD GASES ANY COMBINATION: CPT

## 2025-01-09 PROCEDURE — 5A09357 ASSISTANCE WITH RESPIRATORY VENTILATION, LESS THAN 24 CONSECUTIVE HOURS, CONTINUOUS POSITIVE AIRWAY PRESSURE: ICD-10-PCS | Performed by: INTERNAL MEDICINE

## 2025-01-09 PROCEDURE — 82330 ASSAY OF CALCIUM: CPT

## 2025-01-09 PROCEDURE — 99285 EMERGENCY DEPT VISIT HI MDM: CPT

## 2025-01-09 PROCEDURE — 96365 THER/PROPH/DIAG IV INF INIT: CPT

## 2025-01-09 PROCEDURE — 82947 ASSAY GLUCOSE BLOOD QUANT: CPT

## 2025-01-09 PROCEDURE — 94761 N-INVAS EAR/PLS OXIMETRY MLT: CPT

## 2025-01-09 PROCEDURE — 2700000000 HC OXYGEN THERAPY PER DAY

## 2025-01-09 PROCEDURE — 2500000003 HC RX 250 WO HCPCS: Performed by: INTERNAL MEDICINE

## 2025-01-09 PROCEDURE — 83880 ASSAY OF NATRIURETIC PEPTIDE: CPT

## 2025-01-09 PROCEDURE — 36600 WITHDRAWAL OF ARTERIAL BLOOD: CPT

## 2025-01-09 PROCEDURE — 6360000002 HC RX W HCPCS: Performed by: EMERGENCY MEDICINE

## 2025-01-09 PROCEDURE — 84132 ASSAY OF SERUM POTASSIUM: CPT

## 2025-01-09 PROCEDURE — 87636 SARSCOV2 & INF A&B AMP PRB: CPT

## 2025-01-09 PROCEDURE — 84484 ASSAY OF TROPONIN QUANT: CPT

## 2025-01-09 PROCEDURE — 80053 COMPREHEN METABOLIC PANEL: CPT

## 2025-01-09 PROCEDURE — 83605 ASSAY OF LACTIC ACID: CPT

## 2025-01-09 PROCEDURE — 96375 TX/PRO/DX INJ NEW DRUG ADDON: CPT

## 2025-01-09 PROCEDURE — 96366 THER/PROPH/DIAG IV INF ADDON: CPT

## 2025-01-09 PROCEDURE — 2580000003 HC RX 258: Performed by: EMERGENCY MEDICINE

## 2025-01-09 RX ORDER — IPRATROPIUM BROMIDE AND ALBUTEROL SULFATE 2.5; .5 MG/3ML; MG/3ML
1 SOLUTION RESPIRATORY (INHALATION)
Status: DISCONTINUED | OUTPATIENT
Start: 2025-01-09 | End: 2025-01-10

## 2025-01-09 RX ORDER — LORAZEPAM 2 MG/ML
0.5 INJECTION INTRAMUSCULAR ONCE
Status: COMPLETED | OUTPATIENT
Start: 2025-01-09 | End: 2025-01-09

## 2025-01-09 RX ORDER — ALBUTEROL SULFATE 2.5 MG/.5ML
2.5 SOLUTION RESPIRATORY (INHALATION)
Status: DISCONTINUED | OUTPATIENT
Start: 2025-01-09 | End: 2025-01-11 | Stop reason: HOSPADM

## 2025-01-09 RX ORDER — POTASSIUM CHLORIDE 29.8 MG/ML
20 INJECTION INTRAVENOUS PRN
Status: DISCONTINUED | OUTPATIENT
Start: 2025-01-09 | End: 2025-01-11 | Stop reason: HOSPADM

## 2025-01-09 RX ORDER — IPRATROPIUM BROMIDE AND ALBUTEROL SULFATE 2.5; .5 MG/3ML; MG/3ML
1 SOLUTION RESPIRATORY (INHALATION)
Status: COMPLETED | OUTPATIENT
Start: 2025-01-09 | End: 2025-01-09

## 2025-01-09 RX ORDER — MAGNESIUM SULFATE IN WATER 40 MG/ML
2000 INJECTION, SOLUTION INTRAVENOUS PRN
Status: DISCONTINUED | OUTPATIENT
Start: 2025-01-09 | End: 2025-01-11 | Stop reason: HOSPADM

## 2025-01-09 RX ORDER — ACETAMINOPHEN 325 MG/1
650 TABLET ORAL EVERY 6 HOURS PRN
Status: DISCONTINUED | OUTPATIENT
Start: 2025-01-09 | End: 2025-01-11 | Stop reason: HOSPADM

## 2025-01-09 RX ORDER — IPRATROPIUM BROMIDE AND ALBUTEROL SULFATE 2.5; .5 MG/3ML; MG/3ML
2 SOLUTION RESPIRATORY (INHALATION)
Status: COMPLETED | OUTPATIENT
Start: 2025-01-09 | End: 2025-01-09

## 2025-01-09 RX ORDER — ONDANSETRON 2 MG/ML
4 INJECTION INTRAMUSCULAR; INTRAVENOUS EVERY 6 HOURS PRN
Status: DISCONTINUED | OUTPATIENT
Start: 2025-01-09 | End: 2025-01-11 | Stop reason: HOSPADM

## 2025-01-09 RX ORDER — SODIUM CHLORIDE 0.9 % (FLUSH) 0.9 %
5-40 SYRINGE (ML) INJECTION EVERY 12 HOURS SCHEDULED
Status: DISCONTINUED | OUTPATIENT
Start: 2025-01-09 | End: 2025-01-11 | Stop reason: HOSPADM

## 2025-01-09 RX ORDER — OSELTAMIVIR PHOSPHATE 75 MG/1
75 CAPSULE ORAL 2 TIMES DAILY
Status: DISCONTINUED | OUTPATIENT
Start: 2025-01-11 | End: 2025-01-11 | Stop reason: HOSPADM

## 2025-01-09 RX ORDER — POTASSIUM CHLORIDE 7.45 MG/ML
10 INJECTION INTRAVENOUS PRN
Status: DISCONTINUED | OUTPATIENT
Start: 2025-01-09 | End: 2025-01-11 | Stop reason: HOSPADM

## 2025-01-09 RX ORDER — SODIUM CHLORIDE 0.9 % (FLUSH) 0.9 %
5-40 SYRINGE (ML) INJECTION PRN
Status: DISCONTINUED | OUTPATIENT
Start: 2025-01-09 | End: 2025-01-11 | Stop reason: HOSPADM

## 2025-01-09 RX ORDER — ENOXAPARIN SODIUM 100 MG/ML
30 INJECTION SUBCUTANEOUS 2 TIMES DAILY
Status: DISCONTINUED | OUTPATIENT
Start: 2025-01-09 | End: 2025-01-11 | Stop reason: HOSPADM

## 2025-01-09 RX ORDER — SODIUM CHLORIDE 9 MG/ML
INJECTION, SOLUTION INTRAVENOUS PRN
Status: DISCONTINUED | OUTPATIENT
Start: 2025-01-09 | End: 2025-01-11 | Stop reason: HOSPADM

## 2025-01-09 RX ORDER — OSELTAMIVIR PHOSPHATE 75 MG/1
75 CAPSULE ORAL ONCE
Status: COMPLETED | OUTPATIENT
Start: 2025-01-09 | End: 2025-01-09

## 2025-01-09 RX ORDER — ACETAMINOPHEN 650 MG/1
650 SUPPOSITORY RECTAL EVERY 6 HOURS PRN
Status: DISCONTINUED | OUTPATIENT
Start: 2025-01-09 | End: 2025-01-11 | Stop reason: HOSPADM

## 2025-01-09 RX ORDER — METHYLPREDNISOLONE SODIUM SUCCINATE 40 MG/ML
40 INJECTION INTRAMUSCULAR; INTRAVENOUS DAILY
Status: DISCONTINUED | OUTPATIENT
Start: 2025-01-10 | End: 2025-01-11 | Stop reason: HOSPADM

## 2025-01-09 RX ORDER — POLYETHYLENE GLYCOL 3350 17 G/17G
17 POWDER, FOR SOLUTION ORAL DAILY PRN
Status: DISCONTINUED | OUTPATIENT
Start: 2025-01-09 | End: 2025-01-11 | Stop reason: HOSPADM

## 2025-01-09 RX ORDER — ONDANSETRON 4 MG/1
4 TABLET, ORALLY DISINTEGRATING ORAL EVERY 8 HOURS PRN
Status: DISCONTINUED | OUTPATIENT
Start: 2025-01-09 | End: 2025-01-11 | Stop reason: HOSPADM

## 2025-01-09 RX ORDER — BUDESONIDE 0.5 MG/2ML
0.5 INHALANT ORAL
Status: DISCONTINUED | OUTPATIENT
Start: 2025-01-09 | End: 2025-01-11 | Stop reason: HOSPADM

## 2025-01-09 RX ADMIN — ACETAMINOPHEN 650 MG: 325 TABLET ORAL at 19:00

## 2025-01-09 RX ADMIN — IPRATROPIUM BROMIDE AND ALBUTEROL SULFATE 2 DOSE: 2.5; .5 SOLUTION RESPIRATORY (INHALATION) at 16:08

## 2025-01-09 RX ADMIN — LORAZEPAM 0.5 MG: 2 INJECTION INTRAMUSCULAR; INTRAVENOUS at 19:01

## 2025-01-09 RX ADMIN — LORAZEPAM 0.5 MG: 2 INJECTION INTRAMUSCULAR; INTRAVENOUS at 17:08

## 2025-01-09 RX ADMIN — SODIUM CHLORIDE, PRESERVATIVE FREE 10 ML: 5 INJECTION INTRAVENOUS at 22:20

## 2025-01-09 RX ADMIN — AZITHROMYCIN MONOHYDRATE 500 MG: 500 INJECTION, POWDER, LYOPHILIZED, FOR SOLUTION INTRAVENOUS at 15:58

## 2025-01-09 RX ADMIN — ENOXAPARIN SODIUM 30 MG: 100 INJECTION SUBCUTANEOUS at 22:14

## 2025-01-09 RX ADMIN — IPRATROPIUM BROMIDE AND ALBUTEROL SULFATE 1 DOSE: 2.5; .5 SOLUTION RESPIRATORY (INHALATION) at 16:58

## 2025-01-09 RX ADMIN — METHYLPREDNISOLONE SODIUM SUCCINATE 125 MG: 125 INJECTION INTRAMUSCULAR; INTRAVENOUS at 16:06

## 2025-01-09 RX ADMIN — IPRATROPIUM BROMIDE AND ALBUTEROL SULFATE 1 DOSE: 2.5; .5 SOLUTION RESPIRATORY (INHALATION) at 19:10

## 2025-01-09 RX ADMIN — BUDESONIDE INHALATION 500 MCG: 0.5 SUSPENSION RESPIRATORY (INHALATION) at 19:10

## 2025-01-09 RX ADMIN — CEFTRIAXONE SODIUM 1000 MG: 1 INJECTION, POWDER, FOR SOLUTION INTRAMUSCULAR; INTRAVENOUS at 16:01

## 2025-01-09 RX ADMIN — OSELTAMIVIR PHOSPHATE 75 MG: 75 CAPSULE ORAL at 19:01

## 2025-01-09 ASSESSMENT — PAIN SCALES - GENERAL
PAINLEVEL_OUTOF10: 0
PAINLEVEL_OUTOF10: 0

## 2025-01-09 ASSESSMENT — LIFESTYLE VARIABLES
HOW MANY STANDARD DRINKS CONTAINING ALCOHOL DO YOU HAVE ON A TYPICAL DAY: PATIENT DECLINED
HOW OFTEN DO YOU HAVE A DRINK CONTAINING ALCOHOL: NEVER

## 2025-01-09 NOTE — H&P
4.2   *   CO2 25   BUN 15   MG 1.6         Hemodynamics:   PAP:   CO:     Wedge:   CI:     CVP:    SVR:       PVR:       Ventilator Settings:  Mode Rate Tidal Volume Pressure FiO2 PEEP                    Peak airway pressure:      Minute ventilation:          MEDS: Reviewed    Images: Reviewed  XR CHEST 1 VIEW    Result Date: 1/9/2025   EXAM: XR CHEST 1 VIEW INDICATION: Shortness of Breath COMPARISON: 9/6/2024 in February 28, 2024 FINDINGS: A portable AP radiograph of the chest was obtained at 1451 hours. The patient is on a cardiac monitor. The patient is rotated to the right and this exam is exposed in the lordotic position. Heart size is within normal limits. Lungs demonstrate a shallow depth of inspiration. No pneumonia or pulmonary edema. There is an azygos lobe and fissure. Osseous structures are unremarkable.     1. No acute process Electronically signed by LELA VASQUEZ        ECHO:  None available    CRITICAL CARE CONSULTANT NOTE  I had a face to face encounter with the patient, reviewed and interpreted patient data including clinical events, labs, images, vital signs, I/O's, and examined patient.  I have discussed the case and the plan and management of the patient's care with the consulting services, the bedside nurses and the respiratory therapist.      NOTE OF PERSONAL INVOLVEMENT IN CARE   This patient has a high probability of imminent, clinically significant deterioration, which requires the highest level of preparedness to intervene urgently. I participated in the decision-making and personally managed or directed the management of the following life and organ supporting interventions that required my frequent assessment to treat or prevent imminent deterioration.    I personally spent 31 minutes of critical care time.  This is time spent at this critically ill patient's bedside actively involved in patient care as well as the coordination of care and discussions with the patient's family.   This does not include any procedural time which has been billed separately.    Shaquille Verdugo DO  [facility]  1/9/2025

## 2025-01-09 NOTE — ED PROVIDER NOTES
from that we can attempt high flow to see how he does with that. [PRABHA]   1756 Patient experiencing agitation and anxiety on the BiPAP he did have a good response initially to Ativan we will give another dose. [PRABHA]   1806 Discussed with ICU to evaluate patient for ICU admission given recurrent need for BiPAP. [PRABHA]      ED Course User Index  [PRABHA] Mario Rashid, DO       SEPSIS Reassessment: Patient does NOT meet Sepsis criteria after ED workup (Viral etiology found)    Clinical Management Tools:  Not Applicable    Patient was given the following medications:  Medications   albuterol (PROVENTIL) nebulizer solution 2.5 mg (has no administration in time range)   sodium chloride flush 0.9 % injection 5-40 mL (10 mLs IntraVENous Given 1/10/25 0949)   sodium chloride flush 0.9 % injection 5-40 mL (has no administration in time range)   0.9 % sodium chloride infusion (has no administration in time range)   potassium chloride 20 mEq/50 mL IVPB (Central Line) (has no administration in time range)     Or   potassium chloride 10 mEq/100 mL IVPB (Peripheral Line) (has no administration in time range)   magnesium sulfate 2000 mg in 50 mL IVPB premix (has no administration in time range)   enoxaparin Sodium (LOVENOX) injection 30 mg (30 mg SubCUTAneous Given 1/10/25 0939)   ondansetron (ZOFRAN-ODT) disintegrating tablet 4 mg (has no administration in time range)     Or   ondansetron (ZOFRAN) injection 4 mg (has no administration in time range)   polyethylene glycol (GLYCOLAX) packet 17 g (has no administration in time range)   acetaminophen (TYLENOL) tablet 650 mg (650 mg Oral Given 1/9/25 1900)     Or   acetaminophen (TYLENOL) suppository 650 mg ( Rectal See Alternative 1/9/25 1900)   sodium phosphate 15 mmol in sodium chloride 0.9 % 250 mL IVPB (has no administration in time range)   oseltamivir (TAMIFLU) capsule 75 mg (has no administration in time range)   budesonide (PULMICORT) nebulizer suspension 500 mcg (500 mcg Nebulization  nasal spray  Commonly known as: FLONASE     lithium 300 MG extended release tablet  Commonly known as: LITHOBID  Take 2 tablets by mouth nightly     metFORMIN 500 MG tablet  Commonly known as: GLUCOPHAGE  Take 1 tablet by mouth daily (with breakfast)     montelukast 10 MG tablet  Commonly known as: SINGULAIR     OLANZapine 10 MG tablet  Commonly known as: ZYPREXA  Take 1 tablet by mouth nightly     pravastatin 20 MG tablet  Commonly known as: PRAVACHOL  Take 1 tablet by mouth daily     risperiDONE  MG Prsy subcutaneous injection  Commonly known as: PERSERIS  Inject 0.8 mLs into the skin every 30 days     tamsulosin 0.4 MG capsule  Commonly known as: FLOMAX     traZODone 100 MG tablet  Commonly known as: DESYREL  Take 1 tablet by mouth nightly                DISCONTINUED MEDICATIONS:  Current Discharge Medication List          I am the Primary Clinician of Record: Mario Rashid DO (electronically signed)    (Please note that parts of this dictation were completed with voice recognition software. Quite often unanticipated grammatical, syntax, homophones, and other interpretive errors are inadvertently transcribed by the computer software. Please disregards these errors. Please excuse any errors that have escaped final proofreading.)     Mario Rashid DO  01/10/25 1500

## 2025-01-09 NOTE — ED NOTES
Caretaker, Zaida states that patient has received all medications except the olanzapine  [phone]

## 2025-01-09 NOTE — ED NOTES
Patient becoming increasingly agitated and attempting to take off Bipap mask. Educated on the importance of keeping mask on face, MD aware. 0.5mg ativan given at this time and RT at bedside.

## 2025-01-10 LAB
ALBUMIN SERPL-MCNC: 3.5 G/DL (ref 3.5–5)
ALBUMIN/GLOB SERPL: 1 (ref 1.1–2.2)
ALP SERPL-CCNC: 69 U/L (ref 45–117)
ALT SERPL-CCNC: 34 U/L (ref 12–78)
ANION GAP SERPL CALC-SCNC: 7 MMOL/L (ref 2–12)
AST SERPL W P-5'-P-CCNC: 23 U/L (ref 15–37)
BILIRUB SERPL-MCNC: 0.6 MG/DL (ref 0.2–1)
BUN SERPL-MCNC: 21 MG/DL (ref 6–20)
BUN/CREAT SERPL: 22 (ref 12–20)
CA-I BLD-MCNC: 8.9 MG/DL (ref 8.5–10.1)
CHLORIDE SERPL-SCNC: 110 MMOL/L (ref 97–108)
CO2 SERPL-SCNC: 25 MMOL/L (ref 21–32)
CREAT SERPL-MCNC: 0.97 MG/DL (ref 0.7–1.3)
ERYTHROCYTE [DISTWIDTH] IN BLOOD BY AUTOMATED COUNT: 12.8 % (ref 11.5–14.5)
GLOBULIN SER CALC-MCNC: 3.5 G/DL (ref 2–4)
GLUCOSE SERPL-MCNC: 141 MG/DL (ref 65–100)
HCT VFR BLD AUTO: 38.3 % (ref 36.6–50.3)
HGB BLD-MCNC: 12.3 G/DL (ref 12.1–17)
MAGNESIUM SERPL-MCNC: 2 MG/DL (ref 1.6–2.4)
MCH RBC QN AUTO: 28.2 PG (ref 26–34)
MCHC RBC AUTO-ENTMCNC: 32.1 G/DL (ref 30–36.5)
MCV RBC AUTO: 87.8 FL (ref 80–99)
MRSA DNA SPEC QL NAA+PROBE: NOT DETECTED
NRBC # BLD: 0 K/UL (ref 0–0.01)
NRBC BLD-RTO: 0 PER 100 WBC
PHOSPHATE SERPL-MCNC: 3.2 MG/DL (ref 2.6–4.7)
PLATELET # BLD AUTO: 164 K/UL (ref 150–400)
PMV BLD AUTO: 10.6 FL (ref 8.9–12.9)
POTASSIUM SERPL-SCNC: 4.5 MMOL/L (ref 3.5–5.1)
PROT SERPL-MCNC: 7 G/DL (ref 6.4–8.2)
RBC # BLD AUTO: 4.36 M/UL (ref 4.1–5.7)
SODIUM SERPL-SCNC: 142 MMOL/L (ref 136–145)
WBC # BLD AUTO: 7.3 K/UL (ref 4.1–11.1)

## 2025-01-10 PROCEDURE — 6370000000 HC RX 637 (ALT 250 FOR IP): Performed by: INTERNAL MEDICINE

## 2025-01-10 PROCEDURE — 2700000000 HC OXYGEN THERAPY PER DAY

## 2025-01-10 PROCEDURE — 6370000000 HC RX 637 (ALT 250 FOR IP): Performed by: NURSE PRACTITIONER

## 2025-01-10 PROCEDURE — 85027 COMPLETE CBC AUTOMATED: CPT

## 2025-01-10 PROCEDURE — 6360000002 HC RX W HCPCS: Performed by: INTERNAL MEDICINE

## 2025-01-10 PROCEDURE — 6360000002 HC RX W HCPCS: Performed by: NURSE PRACTITIONER

## 2025-01-10 PROCEDURE — 2500000003 HC RX 250 WO HCPCS: Performed by: INTERNAL MEDICINE

## 2025-01-10 PROCEDURE — 94640 AIRWAY INHALATION TREATMENT: CPT

## 2025-01-10 PROCEDURE — 94664 DEMO&/EVAL PT USE INHALER: CPT

## 2025-01-10 PROCEDURE — 6370000000 HC RX 637 (ALT 250 FOR IP): Performed by: STUDENT IN AN ORGANIZED HEALTH CARE EDUCATION/TRAINING PROGRAM

## 2025-01-10 PROCEDURE — 83735 ASSAY OF MAGNESIUM: CPT

## 2025-01-10 PROCEDURE — 2500000003 HC RX 250 WO HCPCS: Performed by: NURSE PRACTITIONER

## 2025-01-10 PROCEDURE — 80053 COMPREHEN METABOLIC PANEL: CPT

## 2025-01-10 PROCEDURE — 94761 N-INVAS EAR/PLS OXIMETRY MLT: CPT

## 2025-01-10 PROCEDURE — 1100000000 HC RM PRIVATE

## 2025-01-10 PROCEDURE — 36415 COLL VENOUS BLD VENIPUNCTURE: CPT

## 2025-01-10 PROCEDURE — 84100 ASSAY OF PHOSPHORUS: CPT

## 2025-01-10 PROCEDURE — 6360000002 HC RX W HCPCS: Performed by: STUDENT IN AN ORGANIZED HEALTH CARE EDUCATION/TRAINING PROGRAM

## 2025-01-10 RX ORDER — AMLODIPINE BESYLATE 5 MG/1
10 TABLET ORAL DAILY
Status: DISCONTINUED | OUTPATIENT
Start: 2025-01-10 | End: 2025-01-11 | Stop reason: HOSPADM

## 2025-01-10 RX ORDER — TAMSULOSIN HYDROCHLORIDE 0.4 MG/1
0.4 CAPSULE ORAL DAILY
Status: DISCONTINUED | OUTPATIENT
Start: 2025-01-10 | End: 2025-01-11 | Stop reason: HOSPADM

## 2025-01-10 RX ORDER — LITHIUM CARBONATE 300 MG/1
600 TABLET, FILM COATED, EXTENDED RELEASE ORAL
Status: DISCONTINUED | OUTPATIENT
Start: 2025-01-10 | End: 2025-01-11 | Stop reason: HOSPADM

## 2025-01-10 RX ORDER — NICOTINE 21 MG/24HR
1 PATCH, TRANSDERMAL 24 HOURS TRANSDERMAL DAILY
Status: DISCONTINUED | OUTPATIENT
Start: 2025-01-10 | End: 2025-01-11 | Stop reason: HOSPADM

## 2025-01-10 RX ORDER — BUPROPION HYDROCHLORIDE 150 MG/1
150 TABLET, EXTENDED RELEASE ORAL DAILY
Status: DISCONTINUED | OUTPATIENT
Start: 2025-01-10 | End: 2025-01-11 | Stop reason: HOSPADM

## 2025-01-10 RX ORDER — LORAZEPAM 2 MG/ML
1 INJECTION INTRAMUSCULAR EVERY 4 HOURS PRN
Status: DISCONTINUED | OUTPATIENT
Start: 2025-01-10 | End: 2025-01-11 | Stop reason: HOSPADM

## 2025-01-10 RX ORDER — FLUTICASONE PROPIONATE 50 MCG
1 SPRAY, SUSPENSION (ML) NASAL DAILY
Status: DISCONTINUED | OUTPATIENT
Start: 2025-01-10 | End: 2025-01-11 | Stop reason: HOSPADM

## 2025-01-10 RX ORDER — AZITHROMYCIN 500 MG/1
500 TABLET, FILM COATED ORAL DAILY
Status: DISCONTINUED | OUTPATIENT
Start: 2025-01-10 | End: 2025-01-11 | Stop reason: HOSPADM

## 2025-01-10 RX ORDER — TRAZODONE HYDROCHLORIDE 50 MG/1
100 TABLET, FILM COATED ORAL NIGHTLY
Status: DISCONTINUED | OUTPATIENT
Start: 2025-01-10 | End: 2025-01-11 | Stop reason: HOSPADM

## 2025-01-10 RX ORDER — IPRATROPIUM BROMIDE AND ALBUTEROL SULFATE 2.5; .5 MG/3ML; MG/3ML
1 SOLUTION RESPIRATORY (INHALATION) EVERY 6 HOURS
Status: DISCONTINUED | OUTPATIENT
Start: 2025-01-10 | End: 2025-01-11 | Stop reason: HOSPADM

## 2025-01-10 RX ORDER — PRAVASTATIN SODIUM 20 MG
20 TABLET ORAL
Status: DISCONTINUED | OUTPATIENT
Start: 2025-01-10 | End: 2025-01-11 | Stop reason: HOSPADM

## 2025-01-10 RX ADMIN — IPRATROPIUM BROMIDE AND ALBUTEROL SULFATE 1 DOSE: 2.5; .5 SOLUTION RESPIRATORY (INHALATION) at 07:59

## 2025-01-10 RX ADMIN — LITHIUM CARBONATE 600 MG: 300 TABLET, EXTENDED RELEASE ORAL at 18:59

## 2025-01-10 RX ADMIN — TRAZODONE HYDROCHLORIDE 100 MG: 50 TABLET ORAL at 20:42

## 2025-01-10 RX ADMIN — OLANZAPINE 10 MG: 10 TABLET, FILM COATED ORAL at 20:42

## 2025-01-10 RX ADMIN — FLUTICASONE PROPIONATE 1 SPRAY: 50 SPRAY, METERED NASAL at 11:52

## 2025-01-10 RX ADMIN — SODIUM CHLORIDE, PRESERVATIVE FREE 10 ML: 5 INJECTION INTRAVENOUS at 20:42

## 2025-01-10 RX ADMIN — TAMSULOSIN HYDROCHLORIDE 0.4 MG: 0.4 CAPSULE ORAL at 10:31

## 2025-01-10 RX ADMIN — IPRATROPIUM BROMIDE AND ALBUTEROL SULFATE 1 DOSE: 2.5; .5 SOLUTION RESPIRATORY (INHALATION) at 20:47

## 2025-01-10 RX ADMIN — METHYLPREDNISOLONE SODIUM SUCCINATE 40 MG: 40 INJECTION INTRAMUSCULAR; INTRAVENOUS at 09:40

## 2025-01-10 RX ADMIN — AZITHROMYCIN DIHYDRATE 500 MG: 500 TABLET ORAL at 17:54

## 2025-01-10 RX ADMIN — BUDESONIDE INHALATION 500 MCG: 0.5 SUSPENSION RESPIRATORY (INHALATION) at 20:47

## 2025-01-10 RX ADMIN — LORAZEPAM 1 MG: 2 INJECTION INTRAMUSCULAR; INTRAVENOUS at 11:52

## 2025-01-10 RX ADMIN — ENOXAPARIN SODIUM 30 MG: 100 INJECTION SUBCUTANEOUS at 20:42

## 2025-01-10 RX ADMIN — BUPROPION HYDROCHLORIDE 150 MG: 150 TABLET, FILM COATED, EXTENDED RELEASE ORAL at 11:52

## 2025-01-10 RX ADMIN — CEFTRIAXONE SODIUM 1000 MG: 1 INJECTION, POWDER, FOR SOLUTION INTRAMUSCULAR; INTRAVENOUS at 09:39

## 2025-01-10 RX ADMIN — BUDESONIDE INHALATION 500 MCG: 0.5 SUSPENSION RESPIRATORY (INHALATION) at 07:59

## 2025-01-10 RX ADMIN — PRAVASTATIN SODIUM 20 MG: 20 TABLET ORAL at 20:42

## 2025-01-10 RX ADMIN — ENOXAPARIN SODIUM 30 MG: 100 INJECTION SUBCUTANEOUS at 09:39

## 2025-01-10 RX ADMIN — OLANZAPINE 5 MG: 10 INJECTION, POWDER, LYOPHILIZED, FOR SOLUTION INTRAMUSCULAR at 04:51

## 2025-01-10 RX ADMIN — AMLODIPINE BESYLATE 10 MG: 5 TABLET ORAL at 10:31

## 2025-01-10 RX ADMIN — SODIUM CHLORIDE, PRESERVATIVE FREE 10 ML: 5 INJECTION INTRAVENOUS at 09:49

## 2025-01-10 ASSESSMENT — PAIN SCALES - GENERAL
PAINLEVEL_OUTOF10: 0

## 2025-01-10 NOTE — CARE COORDINATION
01/10/25 1157   Service Assessment   Patient Orientation Alert and Oriented   Cognition Alert   History Provided By Patient   Accompanied By/Relationship Sister [employer]   Support Systems Family Members   Patient's Healthcare Decision Maker is: Legal Next of Kin   PCP Verified by CM Yes   Last Visit to PCP Within last 3 months   Prior Functional Level Independent in ADLs/IADLs   Current Functional Level Independent in ADLs/IADLs   Can patient return to prior living arrangement Yes   Ability to make needs known: Good   Family able to assist with home care needs: Yes   Would you like for me to discuss the discharge plan with any other family members/significant others, and if so, who? No   Financial Resources Medicare;Medicaid   Community Resources None   Social/Functional History   Lives With Family     Sister at the bedside and provided information. Sister stays with patient and will transport when discharged. 1-story home with 3 steps to enter. Has PCAs through [facility]. Uses cane PRN. Uses BioSTL. Current disp: Home with Family and PCA    Advance Care Planning     General Advance Care Planning (ACP) Conversation    Date of Conversation: 1/10/2025  Conducted with: Patient with Decision Making Capacity  Other persons present: None    Healthcare Decision Maker: No healthcare decision makers have been documented.     Today we documented Decision Maker(s) consistent with Legal Next of Kin hierarchy.  Content/Action Overview:  Has ACP document(s) on file - reflects the patient's care preferences  Reviewed DNR/DNI and patient elects Full Code (Attempt Resuscitation)    Length of Voluntary ACP Conversation in minutes:  <16 minutes (Non-Billable)    Wil Brock RN

## 2025-01-10 NOTE — PROGRESS NOTES
[facility] Progress Note        Fredy Puente  1965  [id number]  1/10/2025      Brief HPI / Hospital Course:  Mr. Puente is a 59-year-old male with PMH bipolar disorder, schizophrenia, and tobacco use, who presented for shortness of breath.  Patient admitted to ICU for continuous BiPAP needs due to acute hypoxic respiratory failure secondary to influenza pneumonia.    Assessment:  Acute hypoxic respiratory failure  Influenza pneumonia  Possible COPD  Tobacco use  Bipolar disorder  Schizophrenia  Hypertension    Plan:  - Respiratory status improving overall.  Status post continuous BiPAP and currently on nasal cannula.  Will continue to wean respiratory support as tolerated.  If patient is able to maintain of BiPAP throughout the day anticipate transfer out of ICU to medicine floors.  - 5 days of Tamiflu  - Empiric antibiotics for possible bacterial pneumonia; ceftriaxone for 5 days. s/p empiric azithromycin  - Nebulizer therapy with DuoNebs and budesonide scheduled; as needed albuterol  - Questionable COPD diagnosis, will plan for 5 days of steroids  - Continue home amlodipine  - Continue home Flonase  - Clarifying and reordering home psychiatric medications including bupropion, lithium, olanzapine, risperidone, and trazodone  - Nicotine patch        ICU DAILY CHECKLIST     Code Status:  Full Code  DVT Prophylaxis: LWMH 40 mg q12h  T/L/D: PIV  GI ppx: n/a  Diet:  ADULT DIET; Regular  Activity Level:  Up with assistance  ABCDEF Bundle/Checklist Completed: Yes  Disposition: downgrade to general medicine floors  Multidisciplinary Rounds Completed: Yes  Goals of Care Discussion/Palliative:  Yes  Patient/Family Updated: Yes      OBJECTIVE  Vitals:    01/10/25 0705 01/10/25 0759 01/10/25 0800 01/10/25 0900   BP: (!) 144/84  138/72 137/79   Pulse: 89 96 95 (!) 102   Resp: 21 23 21 22   Temp:       TempSrc:       SpO2: 92% 93% 90% 94%   Weight:       Height:           EXAM:   Physical

## 2025-01-10 NOTE — PLAN OF CARE
Problem: Discharge Planning  Goal: Discharge to home or other facility with appropriate resources  Recent Flowsheet Documentation  Taken 1/9/2025 2100 by Vanessa Luevano RN  Discharge to home or other facility with appropriate resources:   Identify barriers to discharge with patient and caregiver   Identify discharge learning needs (meds, wound care, etc)     Problem: Pain  Goal: Verbalizes/displays adequate comfort level or baseline comfort level  Outcome: Progressing  Flowsheets (Taken 1/10/2025 0045)  Verbalizes/displays adequate comfort level or baseline comfort level:   Encourage patient to monitor pain and request assistance   Administer analgesics based on type and severity of pain and evaluate response   Assess pain using appropriate pain scale   Implement non-pharmacological measures as appropriate and evaluate response   Notify Licensed Independent Practitioner if interventions unsuccessful or patient reports new pain     Problem: Safety - Adult  Goal: Free from fall injury  Outcome: Progressing  Flowsheets (Taken 1/10/2025 0045)  Free From Fall Injury:   Instruct family/caregiver on patient safety   Based on caregiver fall risk screen, instruct family/caregiver to ask for assistance with transferring infant if caregiver noted to have fall risk factors     Problem: Respiratory - Adult  Goal: Achieves optimal ventilation and oxygenation  Outcome: Progressing  Flowsheets (Taken 1/10/2025 0045)  Achieves optimal ventilation and oxygenation:   Assess for changes in respiratory status   Position to facilitate oxygenation and minimize respiratory effort   Assess the need for suctioning and aspirate as needed   Respiratory therapy support as indicated   Assess for changes in mentation and behavior   Oxygen supplementation based on oxygen saturation or arterial blood gases   Encourage broncho-pulmonary hygiene including cough, deep breathe, incentive spirometry   Assess and instruct to report shortness of  breath or any respiratory difficulty     Problem: Cardiovascular - Adult  Goal: Maintains optimal cardiac output and hemodynamic stability  Outcome: Progressing  Flowsheets (Taken 1/10/2025 0045)  Maintains optimal cardiac output and hemodynamic stability:   Monitor blood pressure and heart rate   Monitor urine output and notify Licensed Independent Practitioner for values outside of normal range

## 2025-01-10 NOTE — PROGRESS NOTES
Hospitalist Consult Note               Daily Progress Note: 1/10/2025      Hospital Day: 2     Chief complaint:   Chief Complaint   Patient presents with    Cough    Respiratory Distress        Brief HPI/ Hospital course to date:  59M with history of bipolar/schizophrenia presents with dyspnea. Was found to be in respiratory distress, and later found to be positive for influenza A.  Limited history obtained due to poor historian. In the ED noted hypoxic.  Chest x-ray essentially negative. Venous blood gas shows hypoxia with pCO2 less than 27, and mild hypercarbia at 46.6 for pCO2.  Influenza A positive.  Patient was placed on BiPAP for worsening work of breathing. Tamiflu and systemic steroid started. Also initiated on IV ceftriaxone and azithromycin for possible pneumonia. Patient's daughter also reports patient has multiple ED visits recently for wheezing, suspect has undiagnosed COPD. He is a chronic smoker.     --------  Patient is seen today for transfer to medical floor. Discussed case with nursing, overnight weaned off BiPAP. Currently on 1L NC. Not on home oxygen. Patient reports still having some cough, work of breathing getting better. No fever or chills.         All ROS negative otherwise mentioned above.      /76   Pulse 93   Temp 98.7 °F (37.1 °C) (Oral)   Resp 16   Ht 1.803 m (5' 11\")   Wt 102.3 kg (225 lb 8.5 oz)   SpO2 (!) 89%   BMI 31.46 kg/m²  O2 Flow Rate (L/min): 1 L/min O2 Device: Nasal cannula    Temp (24hrs), Av.3 °F (37.4 °C), Min:98 °F (36.7 °C), Max:101.8 °F (38.8 °C)    01/10 0701 - 01/10 1900  In: 444 [P.O.:444]  Out: 250 [Urine:250]   1901 - 01/10 0700  In: -   Out: 500 [Urine:500]    Physical Exam:  General:  Awake and alert   Lungs:   Generalized wheezing on auscultation bilaterally.   Heart:  Regular rate and rhythm, S1, S2 normal, no murmur. No click, rub or gallop.   Abdomen:   Soft, non-tender. Bowel sounds normal. No masses,  No organomegaly.

## 2025-01-11 VITALS
WEIGHT: 226.63 LBS | RESPIRATION RATE: 17 BRPM | HEART RATE: 92 BPM | OXYGEN SATURATION: 100 % | HEIGHT: 71 IN | TEMPERATURE: 98.6 F | SYSTOLIC BLOOD PRESSURE: 136 MMHG | BODY MASS INDEX: 31.73 KG/M2 | DIASTOLIC BLOOD PRESSURE: 77 MMHG

## 2025-01-11 LAB
ALBUMIN SERPL-MCNC: 3.3 G/DL (ref 3.5–5)
ALBUMIN/GLOB SERPL: 1 (ref 1.1–2.2)
ALP SERPL-CCNC: 67 U/L (ref 45–117)
ALT SERPL-CCNC: 37 U/L (ref 12–78)
ANION GAP SERPL CALC-SCNC: 3 MMOL/L (ref 2–12)
AST SERPL W P-5'-P-CCNC: 25 U/L (ref 15–37)
BILIRUB SERPL-MCNC: 0.6 MG/DL (ref 0.2–1)
BUN SERPL-MCNC: 19 MG/DL (ref 6–20)
BUN/CREAT SERPL: 16 (ref 12–20)
CA-I BLD-MCNC: 9 MG/DL (ref 8.5–10.1)
CHLORIDE SERPL-SCNC: 112 MMOL/L (ref 97–108)
CO2 SERPL-SCNC: 26 MMOL/L (ref 21–32)
CREAT SERPL-MCNC: 1.16 MG/DL (ref 0.7–1.3)
DATE LAST DOSE: NORMAL
DOSE AMOUNT: NORMAL UNITS
ERYTHROCYTE [DISTWIDTH] IN BLOOD BY AUTOMATED COUNT: 12.8 % (ref 11.5–14.5)
EST. AVERAGE GLUCOSE BLD GHB EST-MCNC: 111 MG/DL
GLOBULIN SER CALC-MCNC: 3.4 G/DL (ref 2–4)
GLUCOSE SERPL-MCNC: 157 MG/DL (ref 65–100)
HBA1C MFR BLD: 5.5 % (ref 4–5.6)
HCT VFR BLD AUTO: 40.4 % (ref 36.6–50.3)
HGB BLD-MCNC: 12.5 G/DL (ref 12.1–17)
LITHIUM SERPL-SCNC: 0.6 MMOL/L (ref 0.6–1.2)
MAGNESIUM SERPL-MCNC: 2.1 MG/DL (ref 1.6–2.4)
MCH RBC QN AUTO: 28.2 PG (ref 26–34)
MCHC RBC AUTO-ENTMCNC: 30.9 G/DL (ref 30–36.5)
MCV RBC AUTO: 91 FL (ref 80–99)
NRBC # BLD: 0 K/UL (ref 0–0.01)
NRBC BLD-RTO: 0 PER 100 WBC
PHOSPHATE SERPL-MCNC: 2.8 MG/DL (ref 2.6–4.7)
PLATELET # BLD AUTO: 173 K/UL (ref 150–400)
PMV BLD AUTO: 10.6 FL (ref 8.9–12.9)
POTASSIUM SERPL-SCNC: 3.8 MMOL/L (ref 3.5–5.1)
PROT SERPL-MCNC: 6.7 G/DL (ref 6.4–8.2)
RBC # BLD AUTO: 4.44 M/UL (ref 4.1–5.7)
SODIUM SERPL-SCNC: 141 MMOL/L (ref 136–145)
WBC # BLD AUTO: 6.7 K/UL (ref 4.1–11.1)

## 2025-01-11 PROCEDURE — 83036 HEMOGLOBIN GLYCOSYLATED A1C: CPT

## 2025-01-11 PROCEDURE — 94761 N-INVAS EAR/PLS OXIMETRY MLT: CPT

## 2025-01-11 PROCEDURE — 80178 ASSAY OF LITHIUM: CPT

## 2025-01-11 PROCEDURE — 6370000000 HC RX 637 (ALT 250 FOR IP): Performed by: STUDENT IN AN ORGANIZED HEALTH CARE EDUCATION/TRAINING PROGRAM

## 2025-01-11 PROCEDURE — 6370000000 HC RX 637 (ALT 250 FOR IP): Performed by: INTERNAL MEDICINE

## 2025-01-11 PROCEDURE — 6360000002 HC RX W HCPCS: Performed by: STUDENT IN AN ORGANIZED HEALTH CARE EDUCATION/TRAINING PROGRAM

## 2025-01-11 PROCEDURE — 6360000002 HC RX W HCPCS: Performed by: INTERNAL MEDICINE

## 2025-01-11 PROCEDURE — 85027 COMPLETE CBC AUTOMATED: CPT

## 2025-01-11 PROCEDURE — 80053 COMPREHEN METABOLIC PANEL: CPT

## 2025-01-11 PROCEDURE — 2500000003 HC RX 250 WO HCPCS: Performed by: INTERNAL MEDICINE

## 2025-01-11 PROCEDURE — 84100 ASSAY OF PHOSPHORUS: CPT

## 2025-01-11 PROCEDURE — 94640 AIRWAY INHALATION TREATMENT: CPT

## 2025-01-11 PROCEDURE — 36415 COLL VENOUS BLD VENIPUNCTURE: CPT

## 2025-01-11 PROCEDURE — 6370000000 HC RX 637 (ALT 250 FOR IP): Performed by: NURSE PRACTITIONER

## 2025-01-11 PROCEDURE — 83735 ASSAY OF MAGNESIUM: CPT

## 2025-01-11 RX ORDER — BUDESONIDE 0.5 MG/2ML
0.5 INHALANT ORAL
Qty: 60 EACH | Refills: 3 | Status: SHIPPED | OUTPATIENT
Start: 2025-01-11

## 2025-01-11 RX ORDER — NEBULIZER ACCESSORIES
1 KIT MISCELLANEOUS DAILY PRN
Qty: 1 KIT | Refills: 0 | OUTPATIENT
Start: 2025-01-11

## 2025-01-11 RX ORDER — AZITHROMYCIN 500 MG/1
500 TABLET, FILM COATED ORAL DAILY
Qty: 3 TABLET | Refills: 0 | Status: SHIPPED | OUTPATIENT
Start: 2025-01-12 | End: 2025-01-15

## 2025-01-11 RX ORDER — OSELTAMIVIR PHOSPHATE 75 MG/1
75 CAPSULE ORAL 2 TIMES DAILY
Qty: 9 CAPSULE | Refills: 0 | Status: SHIPPED | OUTPATIENT
Start: 2025-01-11 | End: 2025-01-16

## 2025-01-11 RX ORDER — PREDNISONE 20 MG/1
20 TABLET ORAL 2 TIMES DAILY
Qty: 10 TABLET | Refills: 0 | Status: SHIPPED | OUTPATIENT
Start: 2025-01-11 | End: 2025-01-16

## 2025-01-11 RX ORDER — IPRATROPIUM BROMIDE AND ALBUTEROL SULFATE 2.5; .5 MG/3ML; MG/3ML
3 SOLUTION RESPIRATORY (INHALATION) EVERY 6 HOURS PRN
Qty: 360 ML | Refills: 2 | Status: SHIPPED | OUTPATIENT
Start: 2025-01-11

## 2025-01-11 RX ORDER — NICOTINE 21 MG/24HR
1 PATCH, TRANSDERMAL 24 HOURS TRANSDERMAL DAILY
Qty: 30 PATCH | Refills: 3 | Status: SHIPPED | OUTPATIENT
Start: 2025-01-12

## 2025-01-11 RX ORDER — ALBUTEROL SULFATE 90 UG/1
2 INHALANT RESPIRATORY (INHALATION) 4 TIMES DAILY PRN
Qty: 54 G | Refills: 1 | Status: SHIPPED | OUTPATIENT
Start: 2025-01-11

## 2025-01-11 RX ADMIN — FLUTICASONE PROPIONATE 1 SPRAY: 50 SPRAY, METERED NASAL at 09:14

## 2025-01-11 RX ADMIN — SODIUM CHLORIDE, PRESERVATIVE FREE 10 ML: 5 INJECTION INTRAVENOUS at 09:05

## 2025-01-11 RX ADMIN — OSELTAMIVIR PHOSPHATE 75 MG: 75 CAPSULE ORAL at 09:04

## 2025-01-11 RX ADMIN — AMLODIPINE BESYLATE 10 MG: 5 TABLET ORAL at 09:04

## 2025-01-11 RX ADMIN — TAMSULOSIN HYDROCHLORIDE 0.4 MG: 0.4 CAPSULE ORAL at 09:04

## 2025-01-11 RX ADMIN — IPRATROPIUM BROMIDE AND ALBUTEROL SULFATE 1 DOSE: 2.5; .5 SOLUTION RESPIRATORY (INHALATION) at 02:08

## 2025-01-11 RX ADMIN — IPRATROPIUM BROMIDE AND ALBUTEROL SULFATE 1 DOSE: 2.5; .5 SOLUTION RESPIRATORY (INHALATION) at 08:00

## 2025-01-11 RX ADMIN — BUDESONIDE INHALATION 500 MCG: 0.5 SUSPENSION RESPIRATORY (INHALATION) at 08:00

## 2025-01-11 RX ADMIN — ENOXAPARIN SODIUM 30 MG: 100 INJECTION SUBCUTANEOUS at 09:05

## 2025-01-11 RX ADMIN — AZITHROMYCIN DIHYDRATE 500 MG: 500 TABLET ORAL at 09:04

## 2025-01-11 RX ADMIN — METHYLPREDNISOLONE SODIUM SUCCINATE 40 MG: 40 INJECTION INTRAMUSCULAR; INTRAVENOUS at 09:04

## 2025-01-11 RX ADMIN — IPRATROPIUM BROMIDE AND ALBUTEROL SULFATE 1 DOSE: 2.5; .5 SOLUTION RESPIRATORY (INHALATION) at 13:42

## 2025-01-11 RX ADMIN — BUPROPION HYDROCHLORIDE 150 MG: 150 TABLET, FILM COATED, EXTENDED RELEASE ORAL at 09:04

## 2025-01-11 NOTE — CARE COORDINATION
1438: CM noted discharge order.  Patient needs a nebulizer.  CM has sent order to DME company and will update RN with delivery time.    1508: CM got clearance through [employer] to provide patient with nebulizer.  Nebulizer machine given to patient.  Patient's sister (POA) signed and paperwork sent to [employer].    No other CM needs.    Transition of Care Plan:    RUR: 9%  Prior Level of Functioning: Independent  Disposition: Home/self  XIMENA: 1.11.25  If SNF or IPR: Date FOC offered: N/A  Date FOC received: N/A  Accepting facility: N/A  Date authorization started with reference number: N/A  Date authorization received and expires: N/A  Follow up appointments: Per MD CAL[place]TO needed: Nebulizer  Transportation at discharge: Sister  IM/IMM Medicare/[place] letter given: Yes (1/9/25)  Is patient a [place] and connected with [facility]? N/A   If yes, was  transfer form completed and VA notified?   Caregiver Contact: N/A  Discharge Caregiver contacted prior to discharge? Sister at bedside  Care Conference needed? No  Barriers to discharge: None

## 2025-01-11 NOTE — DISCHARGE SUMMARY
Discharge Summary    Name: Fredy Puente  [id number]  YOB: 1965 (Age: 59 y.o.)   Date of Admission: 1/9/2025  Date of Discharge: 1/11/2025  Attending Physician: Ellis Winters MD    Discharge Diagnosis:   # Influenza A infection  # COPD exacerbation   # Acute respiratory failure with hypoxia  # Chronic smoker  # Essential hypertension   # diabetes   # Psychiatric disorder     Consultations:  None    Brief Hospital Course by Main Problems:   59M with history of bipolar/schizophrenia presents with dyspnea. Was found to be in respiratory distress, and later found to be positive for influenza A.  Limited history obtained due to poor historian. In the ED noted hypoxic.  Chest x-ray essentially negative. Venous blood gas shows hypoxia with pCO2 less than 27, and mild hypercarbia at 46.6 for pCO2.  Influenza A positive.  Patient was placed on BiPAP for worsening work of breathing. Tamiflu and systemic steroid started. Also initiated on IV ceftriaxone and azithromycin for possible pneumonia. Patient's daughter also reports patient has multiple ED visits recently for wheezing, suspect has undiagnosed COPD. He is a chronic smoker, extensive smoking cessation counseling given.     Discharge Exam:  Patient seen and examined by me on discharge day. Reports work of breathing improved significant, weaned off oxygen supplement. 6 min walk test showed no desaturation. Patient wanted to use nebulizer for his COPD management, prescribed.     General: Alert, no distress  Head & neck: Normocephalic, without obvious abnormality, atraumatic. Neck supple, symmetrical, trachea midline.   Eyes: Conjunctivae/corneas clear. PERRL, EOMs intact.  Oral: Lips, mucosa, and tongue normal  Lungs: Clear to auscultation bilaterally.   Heart: Regular rate and rhythm, S1, S2 normal, no murmur, click, rub or gallop.  Abdomen: Soft, non-tender. Bowel sounds normal. No masses.  Extremities: no lower  extremity edema. Extremities normal, atraumatic. Moving all four extremities.   Neurologic: CNII-XII intact. No motor or sensory deficits.    Pertinent Findings:  Patient Vitals for the past 24 hrs:   BP Temp Temp src Pulse Resp SpO2 Weight   01/11/25 1343 -- -- -- 92 17 100 % --   01/11/25 0801 -- -- -- 69 19 96 % --   01/11/25 0754 136/77 98.6 °F (37 °C) Oral 65 18 96 % --   01/11/25 0700 -- -- -- 68 -- -- --   01/11/25 0526 -- -- -- -- -- -- 102.8 kg (226 lb 10.1 oz)   01/11/25 0318 116/71 98.4 °F (36.9 °C) Oral 65 18 92 % --   01/11/25 0217 -- -- -- 68 19 94 % --   01/11/25 0208 -- -- -- 78 17 92 % --   01/11/25 0003 -- -- -- 80 -- -- --   01/10/25 2300 129/72 99 °F (37.2 °C) Oral 72 16 90 % --   01/10/25 2249 -- -- -- 87 14 94 % --   01/10/25 2236 107/66 -- -- 86 13 95 % --   01/10/25 2200 127/72 -- -- 84 13 96 % --   01/10/25 2115 128/70 -- -- 100 20 93 % --   01/10/25 2100 -- -- -- 94 22 97 % --   01/10/25 2000 135/76 98.8 °F (37.1 °C) Oral 88 16 92 % --   01/10/25 1900 -- 98.5 °F (36.9 °C) Oral 88 19 93 % --   01/10/25 1800 -- -- -- 92 21 95 % --   01/10/25 1700 136/76 -- -- 84 15 (!) 88 % --   01/10/25 1600 125/73 -- -- 77 14 94 % --   01/10/25 1500 133/76 98.4 °F (36.9 °C) Oral 93 16 (!) 89 % --         Discharge/Recent Laboratory Results:  Recent Labs     01/11/25  1035      K 3.8   *   CO2 26   BUN 19   CREATININE 1.16   GLUCOSE 157*   CALCIUM 9.0   PHOS 2.8   MG 2.1     Recent Labs     01/11/25  1035   HGB 12.5   HCT 40.4   WBC 6.7          Discharge Medications:     Medication List        START taking these medications      albuterol sulfate  (90 Base) MCG/ACT inhaler  Commonly known as: Ventolin HFA  Inhale 2 puffs into the lungs 4 times daily as needed for Wheezing     azithromycin 500 MG tablet  Commonly known as: ZITHROMAX  Take 1 tablet by mouth daily for 3 doses  Start taking on: January 12, 2025     budesonide 0.5 MG/2ML nebulizer suspension  Commonly known as:

## 2025-01-11 NOTE — PROGRESS NOTES
4 Eyes Skin Assessment     NAME:  Fredy Puente  YOB: 1965  MEDICAL RECORD NUMBER:  [id number]    The patient is being assessed for  Transfer to New Unit    I agree that at least one RN has performed a thorough Head to Toe Skin Assessment on the patient. ALL assessment sites listed below have been assessed.      Areas assessed by both nurses:    Head, Face, Ears, Shoulders, Back, Chest, Arms, Elbows, Hands, Sacrum. Buttock, Coccyx, Ischium, Legs. Feet and Heels, and Under Medical Devices         Does the Patient have a Wound? No noted wound(s)       Ralph Prevention initiated by RN: Yes  Wound Care Orders initiated by RN: No    Pressure Injury (Stage 3,4, Unstageable, DTI, NWPT, and Complex wounds) if present, place Wound referral order by RN under : No    New Ostomies, if present place, Ostomy referral order under : No     Nurse 1 eSignature: Electronically signed by Martha Mesa RN on 1/10/25 at 11:44 PM EST    **SHARE this note so that the co-signing nurse can place an eSignature**    Nurse 2 eSignature: Electronically signed by Binta Oglesby RN on 1/10/25 at 11:45 PM EST

## 2025-01-11 NOTE — PLAN OF CARE
Problem: Discharge Planning  Goal: Discharge to home or other facility with appropriate resources  1/11/2025 1032 by Katherine Werner RN  Outcome: Progressing  1/10/2025 2343 by Martha Mesa RN  Outcome: Progressing     Problem: Pain  Goal: Verbalizes/displays adequate comfort level or baseline comfort level  Outcome: Progressing     Problem: Safety - Adult  Goal: Free from fall injury  1/11/2025 1032 by Katherine Werner RN  Outcome: Progressing  1/10/2025 2343 by Martha Mesa RN  Outcome: Progressing     Problem: ABCDS Injury Assessment  Goal: Absence of physical injury  Outcome: Progressing     Problem: Infection - Adult  Goal: Absence of infection at discharge  Outcome: Progressing  Goal: Absence of infection during hospitalization  Outcome: Progressing  Goal: Absence of fever/infection during anticipated neutropenic period  Outcome: Progressing     Problem: Respiratory - Adult  Goal: Achieves optimal ventilation and oxygenation  1/11/2025 1032 by Katherine Werner RN  Outcome: Progressing  1/10/2025 2106 by Kayce Tam RN  Flowsheets (Taken 1/10/2025 2100)  Achieves optimal ventilation and oxygenation:   Assess for changes in respiratory status   Position to facilitate oxygenation and minimize respiratory effort   Initiate smoking cessation protocol as indicated   Respiratory therapy support as indicated   Assess for changes in mentation and behavior   Oxygen supplementation based on oxygen saturation or arterial blood gases   Encourage broncho-pulmonary hygiene including cough, deep breathe, incentive spirometry   Assess and instruct to report shortness of breath or any respiratory difficulty     Problem: Cardiovascular - Adult  Goal: Maintains optimal cardiac output and hemodynamic stability  Outcome: Progressing  Goal: Absence of cardiac dysrhythmias or at baseline  Outcome: Progressing     Problem: Coping  Goal: Pt/Family able to verbalize concerns and demonstrate effective

## 2025-01-11 NOTE — PROGRESS NOTES
Received Order for Telemetry     Fredy Puente   1965   [id number]   Influenza A [J10.1]   Ellis Winters MD     Tele Box # 20 placed on patient at  2248 pm  ER Room # [id number]  Admitting to Room 208  Verified with Primary Nurse Judy at  2306 pm

## 2025-01-12 LAB
BACTERIA SPEC CULT: NORMAL
BACTERIA SPEC CULT: NORMAL
Lab: NORMAL
Lab: NORMAL

## 2025-01-13 LAB
BACTERIA SPEC CULT: NORMAL
Lab: NORMAL

## 2025-01-15 LAB
BACTERIA SPEC CULT: NORMAL
BACTERIA SPEC CULT: NORMAL
Lab: NORMAL
Lab: NORMAL

## 2025-02-08 PROBLEM — J10.1 INFLUENZA A: Status: RESOLVED | Noted: 2025-01-09 | Resolved: 2025-02-08

## 2025-03-11 ENCOUNTER — TRANSCRIBE ORDERS (OUTPATIENT)
Facility: HOSPITAL | Age: 60
End: 2025-03-11

## 2025-03-11 DIAGNOSIS — Z87.891 PERSONAL HISTORY OF TOBACCO USE: Primary | ICD-10-CM

## 2025-03-11 DIAGNOSIS — F17.200 CURRENT SMOKER: ICD-10-CM

## 2025-05-15 NOTE — PLAN OF CARE
Problem: Discharge Planning  Goal: Discharge to home or other facility with appropriate resources  8/11/2024 2217 by Jono Dodge RN  Outcome: Progressing  8/11/2024 1142 by Viola Mustafa LPN  Outcome: Progressing     Problem: Depression  Goal: Will be euthymic at discharge  Description: INTERVENTIONS:  1. Administer medication as ordered  2. Provide emotional support via 1:1 interaction with staff  3. Encourage involvement in milieu/groups/activities  4. Monitor for social isolation  8/11/2024 2217 by Jono Dodge RN  Outcome: Progressing  8/11/2024 1142 by Viola Mustafa LPN  Outcome: Progressing     Problem: Psychosis  Goal: Will report no hallucinations or delusions  Description: INTERVENTIONS:  1. Administer medication as  ordered  2. Assist with reality testing to support increasing orientation  3. Assess if patient's hallucinations or delusions are encouraging self harm or harm to others and intervene as appropriate  8/11/2024 2217 by Jono Dodge RN  Outcome: Progressing  8/11/2024 1142 by Viola Mustafa LPN  Outcome: Progressing     Problem: Behavior  Goal: Pt/Family maintain appropriate behavior and adhere to behavioral management agreement, if implemented  Description: INTERVENTIONS:  1. Assess patient/family's coping skills and  non-compliant behavior (including use of illegal substances)  2. Notify security of behavior or suspected illegal substances which indicate the need for search of the family and/or belongings  3. Encourage verbalization of thoughts and concerns in a socially appropriate manner  4. Utilize positive, consistent limit setting strategies supporting safety of patient, staff and others  5. Encourage participation in the decision making process about the behavioral management agreement  6. If a visitor's behavior poses a threat to safety call refer to organization policy.  7. Initiate consult with , Psychosocial CNS, Spiritual Care as 
  Problem: Discharge Planning  Goal: Discharge to home or other facility with appropriate resources  8/3/2024 9310 by Avtar Singh, RN  Outcome: Progressing  8/3/2024 1338 by Vivian Hoffmann, RN  Outcome: Progressing     
  Problem: Discharge Planning  Goal: Discharge to home or other facility with appropriate resources  8/7/2024 2115 by Avtar Singh RN  Outcome: Progressing  8/7/2024 0726 by Anat Sams RN  Outcome: Progressing     Problem: Depression  Goal: Will be euthymic at discharge  Description: INTERVENTIONS:  1. Administer medication as ordered  2. Provide emotional support via 1:1 interaction with staff  3. Encourage involvement in milieu/groups/activities  4. Monitor for social isolation  8/7/2024 2115 by Avtar Singh RN  Outcome: Progressing  8/7/2024 0726 by Anat Sams RN  Outcome: Progressing     Problem: Psychosis  Goal: Will report no hallucinations or delusions  Description: INTERVENTIONS:  1. Administer medication as  ordered  2. Assist with reality testing to support increasing orientation  3. Assess if patient's hallucinations or delusions are encouraging self harm or harm to others and intervene as appropriate  8/7/2024 2115 by vAtar Singh RN  Outcome: Progressing  8/7/2024 0726 by Anat Sams RN  Outcome: Progressing     Problem: Behavior  Goal: Pt/Family maintain appropriate behavior and adhere to behavioral management agreement, if implemented  Description: INTERVENTIONS:  1. Assess patient/family's coping skills and  non-compliant behavior (including use of illegal substances)  2. Notify security of behavior or suspected illegal substances which indicate the need for search of the family and/or belongings  3. Encourage verbalization of thoughts and concerns in a socially appropriate manner  4. Utilize positive, consistent limit setting strategies supporting safety of patient, staff and others  5. Encourage participation in the decision making process about the behavioral management agreement  6. If a visitor's behavior poses a threat to safety call refer to organization policy.  7. Initiate consult with , Psychosocial CNS, Spiritual Care as appropriate  8/7/2024 2115 by Francisco 
  Problem: Discharge Planning  Goal: Discharge to home or other facility with appropriate resources  8/8/2024 0901 by Vivian Hoffmann RN  Outcome: Progressing  8/7/2024 2115 by Avtar Singh RN  Outcome: Progressing     Problem: Depression  Goal: Will be euthymic at discharge  Description: INTERVENTIONS:  1. Administer medication as ordered  2. Provide emotional support via 1:1 interaction with staff  3. Encourage involvement in milieu/groups/activities  4. Monitor for social isolation  8/8/2024 0901 by Vivian Hoffmann RN  Outcome: Progressing  8/7/2024 2115 by Avtar Singh RN  Outcome: Progressing     Problem: Psychosis  Goal: Will report no hallucinations or delusions  Description: INTERVENTIONS:  1. Administer medication as  ordered  2. Assist with reality testing to support increasing orientation  3. Assess if patient's hallucinations or delusions are encouraging self harm or harm to others and intervene as appropriate  8/8/2024 0901 by Vivian Hoffmann RN  Outcome: Progressing  8/7/2024 2115 by Avtar Singh RN  Outcome: Progressing     Problem: Behavior  Goal: Pt/Family maintain appropriate behavior and adhere to behavioral management agreement, if implemented  Description: INTERVENTIONS:  1. Assess patient/family's coping skills and  non-compliant behavior (including use of illegal substances)  2. Notify security of behavior or suspected illegal substances which indicate the need for search of the family and/or belongings  3. Encourage verbalization of thoughts and concerns in a socially appropriate manner  4. Utilize positive, consistent limit setting strategies supporting safety of patient, staff and others  5. Encourage participation in the decision making process about the behavioral management agreement  6. If a visitor's behavior poses a threat to safety call refer to organization policy.  7. Initiate consult with , Psychosocial CNS, Spiritual Care as 
  Problem: Discharge Planning  Goal: Discharge to home or other facility with appropriate resources  8/8/2024 2036 by Avtar Singh RN  Outcome: Progressing  8/8/2024 0901 by Vivian Hoffmann RN  Outcome: Progressing     Problem: Depression  Goal: Will be euthymic at discharge  Description: INTERVENTIONS:  1. Administer medication as ordered  2. Provide emotional support via 1:1 interaction with staff  3. Encourage involvement in milieu/groups/activities  4. Monitor for social isolation  8/8/2024 2036 by Avtar Singh RN  Outcome: Progressing  8/8/2024 0901 by Vivian Hoffmann RN  Outcome: Progressing     Problem: Psychosis  Goal: Will report no hallucinations or delusions  Description: INTERVENTIONS:  1. Administer medication as  ordered  2. Assist with reality testing to support increasing orientation  3. Assess if patient's hallucinations or delusions are encouraging self harm or harm to others and intervene as appropriate  8/8/2024 2036 by Avtar Singh RN  Outcome: Progressing  8/8/2024 0901 by Vivian Hoffmann RN  Outcome: Progressing     Problem: Behavior  Goal: Pt/Family maintain appropriate behavior and adhere to behavioral management agreement, if implemented  Description: INTERVENTIONS:  1. Assess patient/family's coping skills and  non-compliant behavior (including use of illegal substances)  2. Notify security of behavior or suspected illegal substances which indicate the need for search of the family and/or belongings  3. Encourage verbalization of thoughts and concerns in a socially appropriate manner  4. Utilize positive, consistent limit setting strategies supporting safety of patient, staff and others  5. Encourage participation in the decision making process about the behavioral management agreement  6. If a visitor's behavior poses a threat to safety call refer to organization policy.  7. Initiate consult with , Psychosocial CNS, Spiritual Care as 
  Problem: Discharge Planning  Goal: Discharge to home or other facility with appropriate resources  8/9/2024 0952 by Vivian Hoffmann RN  Outcome: Progressing  8/8/2024 2036 by Avtar Singh RN  Outcome: Progressing     Problem: Depression  Goal: Will be euthymic at discharge  Description: INTERVENTIONS:  1. Administer medication as ordered  2. Provide emotional support via 1:1 interaction with staff  3. Encourage involvement in milieu/groups/activities  4. Monitor for social isolation  8/9/2024 0952 by Vivian Hoffmann RN  Outcome: Progressing  8/8/2024 2036 by Avtar Singh RN  Outcome: Progressing     Problem: Psychosis  Goal: Will report no hallucinations or delusions  Description: INTERVENTIONS:  1. Administer medication as  ordered  2. Assist with reality testing to support increasing orientation  3. Assess if patient's hallucinations or delusions are encouraging self harm or harm to others and intervene as appropriate  8/9/2024 0952 by Vivian Hoffmann RN  Outcome: Progressing  8/8/2024 2036 by Avtar Singh RN  Outcome: Progressing     Problem: Behavior  Goal: Pt/Family maintain appropriate behavior and adhere to behavioral management agreement, if implemented  Description: INTERVENTIONS:  1. Assess patient/family's coping skills and  non-compliant behavior (including use of illegal substances)  2. Notify security of behavior or suspected illegal substances which indicate the need for search of the family and/or belongings  3. Encourage verbalization of thoughts and concerns in a socially appropriate manner  4. Utilize positive, consistent limit setting strategies supporting safety of patient, staff and others  5. Encourage participation in the decision making process about the behavioral management agreement  6. If a visitor's behavior poses a threat to safety call refer to organization policy.  7. Initiate consult with , Psychosocial CNS, Spiritual Care as 
  Problem: Discharge Planning  Goal: Discharge to home or other facility with appropriate resources  8/9/2024 2225 by Jono Dodge RN  Outcome: Progressing  8/9/2024 0952 by Vivian Hoffmann RN  Outcome: Progressing     Problem: Depression  Goal: Will be euthymic at discharge  Description: INTERVENTIONS:  1. Administer medication as ordered  2. Provide emotional support via 1:1 interaction with staff  3. Encourage involvement in milieu/groups/activities  4. Monitor for social isolation  8/9/2024 2225 by Jono Dodge RN  Outcome: Progressing  8/9/2024 0952 by Vivian Hoffmann RN  Outcome: Progressing     Problem: Psychosis  Goal: Will report no hallucinations or delusions  Description: INTERVENTIONS:  1. Administer medication as  ordered  2. Assist with reality testing to support increasing orientation  3. Assess if patient's hallucinations or delusions are encouraging self harm or harm to others and intervene as appropriate  8/9/2024 2225 by Jono Dodge RN  Outcome: Progressing  8/9/2024 0952 by Vivian Hoffmann RN  Outcome: Progressing     Problem: Behavior  Goal: Pt/Family maintain appropriate behavior and adhere to behavioral management agreement, if implemented  Description: INTERVENTIONS:  1. Assess patient/family's coping skills and  non-compliant behavior (including use of illegal substances)  2. Notify security of behavior or suspected illegal substances which indicate the need for search of the family and/or belongings  3. Encourage verbalization of thoughts and concerns in a socially appropriate manner  4. Utilize positive, consistent limit setting strategies supporting safety of patient, staff and others  5. Encourage participation in the decision making process about the behavioral management agreement  6. If a visitor's behavior poses a threat to safety call refer to organization policy.  7. Initiate consult with , Psychosocial CNS, Spiritual Care as 
  Problem: Discharge Planning  Goal: Discharge to home or other facility with appropriate resources  Outcome: Progressing     Problem: Depression  Goal: Will be euthymic at discharge  Description: INTERVENTIONS:  1. Administer medication as ordered  2. Provide emotional support via 1:1 interaction with staff  3. Encourage involvement in milieu/groups/activities  4. Monitor for social isolation  8/5/2024 2128 by Jono Dodge RN  Outcome: Progressing  8/5/2024 1336 by Shelton French RN  Outcome: Progressing     Problem: Psychosis  Goal: Will report no hallucinations or delusions  Description: INTERVENTIONS:  1. Administer medication as  ordered  2. Assist with reality testing to support increasing orientation  3. Assess if patient's hallucinations or delusions are encouraging self harm or harm to others and intervene as appropriate  8/5/2024 2128 by Jono Dodge RN  Outcome: Progressing  8/5/2024 1336 by Shelton French RN  Outcome: Progressing     Problem: Behavior  Goal: Pt/Family maintain appropriate behavior and adhere to behavioral management agreement, if implemented  Description: INTERVENTIONS:  1. Assess patient/family's coping skills and  non-compliant behavior (including use of illegal substances)  2. Notify security of behavior or suspected illegal substances which indicate the need for search of the family and/or belongings  3. Encourage verbalization of thoughts and concerns in a socially appropriate manner  4. Utilize positive, consistent limit setting strategies supporting safety of patient, staff and others  5. Encourage participation in the decision making process about the behavioral management agreement  6. If a visitor's behavior poses a threat to safety call refer to organization policy.  7. Initiate consult with , Psychosocial CNS, Spiritual Care as appropriate  8/5/2024 2128 by Jono Dodge RN  Outcome: Progressing  8/5/2024 1336 by Gillian 
  Problem: Discharge Planning  Goal: Discharge to home or other facility with appropriate resources  Outcome: Progressing     Problem: Depression  Goal: Will be euthymic at discharge  Description: INTERVENTIONS:  1. Administer medication as ordered  2. Provide emotional support via 1:1 interaction with staff  3. Encourage involvement in milieu/groups/activities  4. Monitor for social isolation  Outcome: Progressing     Problem: Psychosis  Goal: Will report no hallucinations or delusions  Description: INTERVENTIONS:  1. Administer medication as  ordered  2. Assist with reality testing to support increasing orientation  3. Assess if patient's hallucinations or delusions are encouraging self harm or harm to others and intervene as appropriate  Outcome: Progressing     Problem: Behavior  Goal: Pt/Family maintain appropriate behavior and adhere to behavioral management agreement, if implemented  Description: INTERVENTIONS:  1. Assess patient/family's coping skills and  non-compliant behavior (including use of illegal substances)  2. Notify security of behavior or suspected illegal substances which indicate the need for search of the family and/or belongings  3. Encourage verbalization of thoughts and concerns in a socially appropriate manner  4. Utilize positive, consistent limit setting strategies supporting safety of patient, staff and others  5. Encourage participation in the decision making process about the behavioral management agreement  6. If a visitor's behavior poses a threat to safety call refer to organization policy.  7. Initiate consult with , Psychosocial CNS, Spiritual Care as appropriate  Outcome: Progressing     Problem: Anxiety  Goal: Will report anxiety at manageable levels  Description: INTERVENTIONS:  1. Administer medication as ordered  2. Teach and rehearse alternative coping skills  3. Provide emotional support with 1:1 interaction with staff  Outcome: Progressing     Problem: 
ERICA Peoples  Outcome: Progressing     Problem: Anxiety  Goal: Will report anxiety at manageable levels  Description: INTERVENTIONS:  1. Administer medication as ordered  2. Teach and rehearse alternative coping skills  3. Provide emotional support with 1:1 interaction with staff  8/6/2024 2126 by Jono Dodge RN  Outcome: Progressing  8/6/2024 1029 by Shelton French RN  Outcome: Progressing     Problem: Involuntary Admit  Goal: Will cooperate with staff recommendations and doctor's orders and will demonstrate appropriate behavior  Description: INTERVENTIONS:  1. Treat underlying conditions and offer medication as ordered  2. Educate regarding involuntary admission procedures and rules  3. Contain excessive/inappropriate behavior per unit and hospital policies  8/6/2024 2126 by Jono Dodge RN  Outcome: Progressing  8/6/2024 1029 by Shelton French RN  Outcome: Progressing     Problem: Safety - Adult  Goal: Free from fall injury  8/6/2024 2126 by Jono Dodge RN  Outcome: Progressing  8/6/2024 1029 by Shelton French RN  Outcome: Progressing     Problem: Chronic Conditions and Co-morbidities  Goal: Patient's chronic conditions and co-morbidity symptoms are monitored and maintained or improved  8/6/2024 2126 by Jono Dodge RN  Outcome: Progressing  8/6/2024 1029 by Shelton French RN  Outcome: Progressing     Problem: Risk for Elopement  Goal: Patient will not exit the unit/facility without proper excort  8/6/2024 2126 by Jono Dodge RN  Outcome: Progressing  8/6/2024 1029 by Shelton French RN  Outcome: Progressing     
1C/with patient

## (undated) DEVICE — MASK ANES INF SZ 2 PREM TAIL VLV INFL PRT UNSCENTED SGL PT